# Patient Record
Sex: MALE | Race: WHITE | NOT HISPANIC OR LATINO | Employment: FULL TIME | URBAN - METROPOLITAN AREA
[De-identification: names, ages, dates, MRNs, and addresses within clinical notes are randomized per-mention and may not be internally consistent; named-entity substitution may affect disease eponyms.]

---

## 2019-10-28 ENCOUNTER — EVALUATION (OUTPATIENT)
Dept: PHYSICAL THERAPY | Facility: CLINIC | Age: 64
End: 2019-10-28
Payer: COMMERCIAL

## 2019-10-28 DIAGNOSIS — G89.29 CHRONIC LEFT SHOULDER PAIN: Primary | ICD-10-CM

## 2019-10-28 DIAGNOSIS — M25.512 CHRONIC LEFT SHOULDER PAIN: Primary | ICD-10-CM

## 2019-10-28 PROCEDURE — 97161 PT EVAL LOW COMPLEX 20 MIN: CPT | Performed by: PHYSICAL THERAPIST

## 2019-10-28 PROCEDURE — 97140 MANUAL THERAPY 1/> REGIONS: CPT | Performed by: PHYSICAL THERAPIST

## 2019-10-28 PROCEDURE — 97110 THERAPEUTIC EXERCISES: CPT | Performed by: PHYSICAL THERAPIST

## 2019-10-28 NOTE — LETTER
2019    Malena Reyez MD  [facility]    Patient: Bridget Olivo   YOB: 1955   Date of Visit: 10/28/2019     Encounter Diagnosis     ICD-10-CM    1  Chronic left shoulder pain M25 512     G89 29        Dear Dr Nguyễn Mane: Thank you for your recent referral of Bridget Olivo  Please review the attached evaluation summary from Alan's recent visit  Please verify that you agree with the plan of care by signing the attached order  If you have any questions or concerns, please do not hesitate to call  I sincerely appreciate the opportunity to share in the care of one of your patients and hope to have another opportunity to work with you in the near future  Sincerely,    Flynn Mendoza, PT      Referring Provider:      I certify that I have read the below Plan of Care and certify the need for these services furnished under this plan of treatment while under my care  Malena Reyez MD  [address] [place] [address]: [phone]          PT Evaluation     Today's date: 10/28/2019  Patient name: Bridget Olivo  : 1955  MRN: [id number]  Referring provider: No ref  provider found  Dx:   Encounter Diagnosis     ICD-10-CM    1  Chronic left shoulder pain M25 512     G89 29                   Assessment  Assessment details: Patient presents with posture related shoulder pain now due to tendonitis of the shoulder  Normal shoulder ROM and strength  Reviewed posture and issued home periscapular exercises  He had an MRI - Scar tissue and tendonitis  Discussed him getting injection to decrease the inflammation  Graston to periscap to decrease tightness  Patient has a $75 00 copay  He may come back for more visits  Impairments: poor posture   Understanding of Dx/Px/POC: good   Prognosis: good    Goals  2 weeks  Improve posture  Decrease posture related pain    4 weeks  No pain with overhead reach  Independent with HEP  Plan  Planned modality interventions: ultrasound and TENS  Planned therapy interventions: manual therapy, strengthening, stretching and home exercise program  Frequency: 2x week  Duration in weeks: 4        Subjective Evaluation    History of Present Illness  Mechanism of injury: Chronic pain in left shoulder  MRI RTC tendonitis    Pain  Current pain ratin  At worst pain ratin  Quality: sharp  Aggravating factors: overhead activity and lifting      Diagnostic Tests  MRI studies: normal  Patient Goals  Patient goals for therapy: decreased pain          Objective     Active Range of Motion   Left Shoulder   Normal active range of motion    Strength/Myotome Testing     Left Shoulder   Normal muscle strength             Precautions: RTC tendonitis      Manual  10/28            graston             taping                                                        Exercise Diary  10/28            HEP             Plamks             Side plank on elbows             Push ups 3-ways             BS rows             Pr 3-ways                                                                                                                                                                                                       Modalities              US

## 2019-10-28 NOTE — LETTER
2019    Alfonso Gonzalez MD  [facility]    Patient: Milvia Noble   YOB: 1955   Date of Visit: 10/28/2019     Encounter Diagnosis     ICD-10-CM    1  Chronic left shoulder pain M25 512     G89 29        Dear Dr Robb Jimenez: Thank you for your recent referral of Milvia Noble  Please review the attached evaluation summary from Alan's recent visit  Please verify that you agree with the plan of care by signing the attached order  If you have any questions or concerns, please do not hesitate to call  I sincerely appreciate the opportunity to share in the care of one of your patients and hope to have another opportunity to work with you in the near future  Sincerely,    Pablo Aviles, PT      Referring Provider:      I certify that I have read the below Plan of Care and certify the need for these services furnished under this plan of treatment while under my care  Alfonso Gonzalez MD  [address] [facility]: [phone]          PT Evaluation     Today's date: 10/28/2019  Patient name: Milvia Noble  : 1955  MRN: [id number]  Referring provider: No ref  provider found  Dx:   Encounter Diagnosis     ICD-10-CM    1  Chronic left shoulder pain M25 512     G89 29                   Assessment  Assessment details: Patient presents with posture related shoulder pain now due to tendonitis of the shoulder  Normal shoulder ROM and strength  Reviewed posture and issued home periscapular exercises  He had an MRI - Scar tissue and tendonitis  Discussed him getting injection to decrease the inflammation  Graston to periscap to decrease tightness  Patient has a $75 00 copay  He may come back for more visits  Impairments: poor posture   Understanding of Dx/Px/POC: good   Prognosis: good    Goals  2 weeks  Improve posture  Decrease posture related pain    4 weeks  No pain with overhead reach  Independent with HEP  Plan  Planned modality interventions: ultrasound and TENS  Planned therapy interventions: manual therapy, strengthening, stretching and home exercise program  Frequency: 2x week  Duration in weeks: 4        Subjective Evaluation    History of Present Illness  Mechanism of injury: Chronic pain in left shoulder  MRI RTC tendonitis    Pain  Current pain ratin  At worst pain ratin  Quality: sharp  Aggravating factors: overhead activity and lifting      Diagnostic Tests  MRI studies: normal  Patient Goals  Patient goals for therapy: decreased pain          Objective     Active Range of Motion   Left Shoulder   Normal active range of motion    Strength/Myotome Testing     Left Shoulder   Normal muscle strength             Precautions: RTC tendonitis      Manual  10/28            graston             taping                                                        Exercise Diary  10/28            HEP             Plamks             Side plank on elbows             Push ups 3-ways             BS rows             Pr 3-ways                                                                                                                                                                                                       Modalities              US

## 2019-10-28 NOTE — PROGRESS NOTES
PT Evaluation     Today's date: 10/28/2019  Patient name: Derick Donnelly  : 1955  MRN: [id number]  Referring provider: No ref  provider found  Dx:   Encounter Diagnosis     ICD-10-CM    1  Chronic left shoulder pain M25 512     G89 29                   Assessment  Assessment details: Patient presents with posture related shoulder pain now due to tendonitis of the shoulder  Normal shoulder ROM and strength  Reviewed posture and issued home periscapular exercises  He had an MRI - Scar tissue and tendonitis  Discussed him getting injection to decrease the inflammation  Graston to periscap to decrease tightness  Patient has a $75 00 copay  He may come back for more visits  Impairments: poor posture   Understanding of Dx/Px/POC: good   Prognosis: good    Goals  2 weeks  Improve posture  Decrease posture related pain  4 weeks  No pain with overhead reach  Independent with HEP  Plan  Planned modality interventions: ultrasound and TENS  Planned therapy interventions: manual therapy, strengthening, stretching and home exercise program  Frequency: 2x week  Duration in weeks: 4        Subjective Evaluation    History of Present Illness  Mechanism of injury: Chronic pain in left shoulder  MRI RTC tendonitis    Pain  Current pain ratin  At worst pain ratin  Quality: sharp  Aggravating factors: overhead activity and lifting      Diagnostic Tests  MRI studies: normal  Patient Goals  Patient goals for therapy: decreased pain          Objective     Active Range of Motion   Left Shoulder   Normal active range of motion    Strength/Myotome Testing     Left Shoulder   Normal muscle strength             Precautions: RTC tendonitis      Manual  10/28            graston             taping                                                        Exercise Diary  10/28            HEP             Plamks             Side plank on elbows             Push ups 3-ways             BS rows             Pr 3-ways Modalities              US

## 2019-10-31 ENCOUNTER — TRANSCRIBE ORDERS (OUTPATIENT)
Dept: PHYSICAL THERAPY | Facility: CLINIC | Age: 64
End: 2019-10-31

## 2019-10-31 DIAGNOSIS — G89.29 CHRONIC LEFT SHOULDER PAIN: Primary | ICD-10-CM

## 2019-10-31 DIAGNOSIS — M25.512 CHRONIC LEFT SHOULDER PAIN: Primary | ICD-10-CM

## 2022-01-31 ENCOUNTER — TELEPHONE (OUTPATIENT)
Dept: NEPHROLOGY | Facility: CLINIC | Age: 67
End: 2022-01-31

## 2022-01-31 NOTE — TELEPHONE ENCOUNTER
Pt's wife called and LM asking for appt  She stated that pt was in ER at [address] and was informed that kidneys are getting worse  Please advise if pt can wait till April

## 2022-07-15 ENCOUNTER — EMERGENCY (EMERGENCY)
Facility: HOSPITAL | Age: 67
LOS: 1 days | Discharge: HOME | End: 2022-07-15
Attending: EMERGENCY MEDICINE | Admitting: EMERGENCY MEDICINE
Payer: COMMERCIAL

## 2022-07-15 VITALS
SYSTOLIC BLOOD PRESSURE: 132 MMHG | TEMPERATURE: 99 F | HEART RATE: 70 BPM | OXYGEN SATURATION: 98 % | RESPIRATION RATE: 17 BRPM | DIASTOLIC BLOOD PRESSURE: 87 MMHG

## 2022-07-15 VITALS
DIASTOLIC BLOOD PRESSURE: 94 MMHG | SYSTOLIC BLOOD PRESSURE: 142 MMHG | TEMPERATURE: 98 F | OXYGEN SATURATION: 96 % | RESPIRATION RATE: 18 BRPM | WEIGHT: 164.91 LBS | HEART RATE: 67 BPM

## 2022-07-15 DIAGNOSIS — Z85.46 PERSONAL HISTORY OF MALIGNANT NEOPLASM OF PROSTATE: ICD-10-CM

## 2022-07-15 DIAGNOSIS — R10.30 LOWER ABDOMINAL PAIN, UNSPECIFIED: ICD-10-CM

## 2022-07-15 DIAGNOSIS — Z20.822 CONTACT WITH AND (SUSPECTED) EXPOSURE TO COVID-19: ICD-10-CM

## 2022-07-15 LAB
ALBUMIN SERPL ELPH-MCNC: 4.1 G/DL — SIGNIFICANT CHANGE UP (ref 3.3–5)
ALP SERPL-CCNC: 90 U/L — SIGNIFICANT CHANGE UP (ref 40–120)
ALT FLD-CCNC: 49 U/L — HIGH (ref 10–45)
ANION GAP SERPL CALC-SCNC: 10 MMOL/L — SIGNIFICANT CHANGE UP (ref 5–17)
APPEARANCE UR: CLEAR — SIGNIFICANT CHANGE UP
APTT BLD: 33.2 SEC — SIGNIFICANT CHANGE UP (ref 27.5–35.5)
AST SERPL-CCNC: 25 U/L — SIGNIFICANT CHANGE UP (ref 10–40)
BASOPHILS # BLD AUTO: 0.02 K/UL — SIGNIFICANT CHANGE UP (ref 0–0.2)
BASOPHILS NFR BLD AUTO: 0.4 % — SIGNIFICANT CHANGE UP (ref 0–2)
BILIRUB SERPL-MCNC: 0.4 MG/DL — SIGNIFICANT CHANGE UP (ref 0.2–1.2)
BILIRUB UR-MCNC: NEGATIVE — SIGNIFICANT CHANGE UP
BUN SERPL-MCNC: 22 MG/DL — SIGNIFICANT CHANGE UP (ref 7–23)
CALCIUM SERPL-MCNC: 9.5 MG/DL — SIGNIFICANT CHANGE UP (ref 8.4–10.5)
CHLORIDE SERPL-SCNC: 103 MMOL/L — SIGNIFICANT CHANGE UP (ref 96–108)
CO2 SERPL-SCNC: 28 MMOL/L — SIGNIFICANT CHANGE UP (ref 22–31)
COLOR SPEC: YELLOW — SIGNIFICANT CHANGE UP
CREAT SERPL-MCNC: 0.93 MG/DL — SIGNIFICANT CHANGE UP (ref 0.5–1.3)
DIFF PNL FLD: NEGATIVE — SIGNIFICANT CHANGE UP
EGFR: 90 ML/MIN/1.73M2 — SIGNIFICANT CHANGE UP
EOSINOPHIL # BLD AUTO: 0.04 K/UL — SIGNIFICANT CHANGE UP (ref 0–0.5)
EOSINOPHIL NFR BLD AUTO: 0.7 % — SIGNIFICANT CHANGE UP (ref 0–6)
GLUCOSE SERPL-MCNC: 271 MG/DL — HIGH (ref 70–99)
GLUCOSE UR QL: 500
HCT VFR BLD CALC: 41.7 % — SIGNIFICANT CHANGE UP (ref 39–50)
HGB BLD-MCNC: 14.4 G/DL — SIGNIFICANT CHANGE UP (ref 13–17)
IMM GRANULOCYTES NFR BLD AUTO: 0.4 % — SIGNIFICANT CHANGE UP (ref 0–1.5)
INR BLD: 0.99 — SIGNIFICANT CHANGE UP (ref 0.88–1.16)
KETONES UR-MCNC: ABNORMAL MG/DL
LEUKOCYTE ESTERASE UR-ACNC: NEGATIVE — SIGNIFICANT CHANGE UP
LIDOCAIN IGE QN: 29 U/L — SIGNIFICANT CHANGE UP (ref 7–60)
LYMPHOCYTES # BLD AUTO: 0.99 K/UL — LOW (ref 1–3.3)
LYMPHOCYTES # BLD AUTO: 18.1 % — SIGNIFICANT CHANGE UP (ref 13–44)
MCHC RBC-ENTMCNC: 30.6 PG — SIGNIFICANT CHANGE UP (ref 27–34)
MCHC RBC-ENTMCNC: 34.5 GM/DL — SIGNIFICANT CHANGE UP (ref 32–36)
MCV RBC AUTO: 88.5 FL — SIGNIFICANT CHANGE UP (ref 80–100)
MONOCYTES # BLD AUTO: 0.32 K/UL — SIGNIFICANT CHANGE UP (ref 0–0.9)
MONOCYTES NFR BLD AUTO: 5.9 % — SIGNIFICANT CHANGE UP (ref 2–14)
NEUTROPHILS # BLD AUTO: 4.07 K/UL — SIGNIFICANT CHANGE UP (ref 1.8–7.4)
NEUTROPHILS NFR BLD AUTO: 74.5 % — SIGNIFICANT CHANGE UP (ref 43–77)
NITRITE UR-MCNC: NEGATIVE — SIGNIFICANT CHANGE UP
NRBC # BLD: 0 /100 WBCS — SIGNIFICANT CHANGE UP (ref 0–0)
PH UR: 6 — SIGNIFICANT CHANGE UP (ref 5–8)
PLATELET # BLD AUTO: 150 K/UL — SIGNIFICANT CHANGE UP (ref 150–400)
POTASSIUM SERPL-MCNC: 4.5 MMOL/L — SIGNIFICANT CHANGE UP (ref 3.5–5.3)
POTASSIUM SERPL-SCNC: 4.5 MMOL/L — SIGNIFICANT CHANGE UP (ref 3.5–5.3)
PROT SERPL-MCNC: 6.5 G/DL — SIGNIFICANT CHANGE UP (ref 6–8.3)
PROT UR-MCNC: NEGATIVE MG/DL — SIGNIFICANT CHANGE UP
PROTHROM AB SERPL-ACNC: 11.8 SEC — SIGNIFICANT CHANGE UP (ref 10.5–13.4)
RBC # BLD: 4.71 M/UL — SIGNIFICANT CHANGE UP (ref 4.2–5.8)
RBC # FLD: 12.2 % — SIGNIFICANT CHANGE UP (ref 10.3–14.5)
SARS-COV-2 RNA SPEC QL NAA+PROBE: NEGATIVE — SIGNIFICANT CHANGE UP
SODIUM SERPL-SCNC: 141 MMOL/L — SIGNIFICANT CHANGE UP (ref 135–145)
SP GR SPEC: 1.02 — SIGNIFICANT CHANGE UP (ref 1–1.03)
UROBILINOGEN FLD QL: 0.2 E.U./DL — SIGNIFICANT CHANGE UP
WBC # BLD: 5.46 K/UL — SIGNIFICANT CHANGE UP (ref 3.8–10.5)
WBC # FLD AUTO: 5.46 K/UL — SIGNIFICANT CHANGE UP (ref 3.8–10.5)

## 2022-07-15 PROCEDURE — 99285 EMERGENCY DEPT VISIT HI MDM: CPT

## 2022-07-15 PROCEDURE — 87635 SARS-COV-2 COVID-19 AMP PRB: CPT

## 2022-07-15 PROCEDURE — 85730 THROMBOPLASTIN TIME PARTIAL: CPT

## 2022-07-15 PROCEDURE — 83690 ASSAY OF LIPASE: CPT

## 2022-07-15 PROCEDURE — 36415 COLL VENOUS BLD VENIPUNCTURE: CPT

## 2022-07-15 PROCEDURE — 85610 PROTHROMBIN TIME: CPT

## 2022-07-15 PROCEDURE — 80053 COMPREHEN METABOLIC PANEL: CPT

## 2022-07-15 PROCEDURE — 74177 CT ABD & PELVIS W/CONTRAST: CPT | Mod: 26,MA

## 2022-07-15 PROCEDURE — 99284 EMERGENCY DEPT VISIT MOD MDM: CPT | Mod: 25

## 2022-07-15 PROCEDURE — 85025 COMPLETE CBC W/AUTO DIFF WBC: CPT

## 2022-07-15 PROCEDURE — 74177 CT ABD & PELVIS W/CONTRAST: CPT | Mod: MA

## 2022-07-15 PROCEDURE — 81003 URINALYSIS AUTO W/O SCOPE: CPT

## 2022-07-15 NOTE — ED PROVIDER NOTE - CLINICAL SUMMARY MEDICAL DECISION MAKING FREE TEXT BOX
2 weeks intermittent lower abd pain. mild now. no fever/signs of infection. history of prior prostatectomy/cancer, diverticulitis. labs/ct ordered. ua neg for infection, wbc wnl, ct without acute pathology. plan prn tylenol. f/u pmd. return precautions discussed

## 2022-07-15 NOTE — ED PROVIDER NOTE - NSFOLLOWUPINSTRUCTIONS_ED_ALL_ED_FT
Take tylenol 2 pills every 6 hours as needed for pain. Please see your primary care provider for followup.  Call for appointment.  If you have any problems with followup, please call the ED Referral Coordinator at [phone].  Return to the ER if symptoms worsen or other concerns.    Acute Abdominal Pain    WHAT YOU NEED TO KNOW:    The cause of your abdominal pain may not be found. If a cause is found, treatment will depend on what the cause is.     DISCHARGE INSTRUCTIONS:    Return to the emergency department if:     You vomit blood or cannot stop vomiting.      You have blood in your bowel movement or it looks like tar.       You have bleeding from your rectum.       Your abdomen is larger than usual, more painful, and hard.       You have severe pain in your abdomen.       You stop passing gas and having bowel movements.       You feel weak, dizzy, or faint.    Contact your healthcare provider if:     You have a fever.      You have new signs and symptoms.      Your symptoms do not get better with treatment.       You have questions or concerns about your condition or care.    Medicines may be given to decrease pain, treat an infection, and manage your symptoms. Take your medicine as directed. Call your healthcare provider if you think your medicine is not helping or if you have side effects. Tell him if you are allergic to any medicine. Keep a list of the medicines, vitamins, and herbs you take. Include the amounts, and when and why you take them. Bring the list or the pill bottles to follow-up visits. Carry your medicine list with you in case of an emergency.    Manage your symptoms:     Apply heat on your abdomen for 20 to 30 minutes every 2 hours for as many days as directed. Heat helps decrease pain and muscle spasms.       Manage your stress. Stress may cause abdominal pain. Your healthcare provider may recommend relaxation techniques and deep breathing exercises to help decrease your stress. Your healthcare provider may recommend you talk to someone about your stress or anxiety, such as a counselor or a trusted friend. Get plenty of sleep and exercise regularly.       Limit or do not drink alcohol. Alcohol can make your abdominal pain worse. Ask your healthcare provider if it is safe for you to drink alcohol. Also ask how much is safe for you to drink.       Do not smoke. Nicotine and other chemicals in cigarettes can damage your esophagus and stomach. Ask your healthcare provider for information if you currently smoke and need help to quit. E-cigarettes or smokeless tobacco still contain nicotine. Talk to your healthcare provider before you use these products.     Make changes to the food you eat as directed: Do not eat foods that cause abdominal pain or other symptoms. Eat small meals more often.     Eat more high-fiber foods if you are constipated. High-fiber foods include fruits, vegetables, whole-grain foods, and legumes.       Do not eat foods that cause gas if you have bloating. Examples include broccoli, cabbage, and cauliflower. Do not drink soda or carbonated drinks, because these may also cause gas.       Do not eat foods or drinks that contain sorbitol or fructose if you have diarrhea and bloating. Some examples are fruit juices, candy, jelly, and sugar-free gum.       Do not eat high-fat foods, such as fried foods, cheeseburgers, hot dogs, and desserts.      Limit or do not drink caffeine. Caffeine may make symptoms, such as heart burn or nausea, worse.       Drink plenty of liquids to prevent dehydration from diarrhea or vomiting. Ask your healthcare provider how much liquid to drink each day and which liquids are best for you.     Follow up with your healthcare provider as directed: Write down your questions so you remember to ask them during your visits.        SEEK IMMEDIATE MEDICAL CARE IF YOU HAVE ANY OF THE FOLLOWING SYMPTOMS: worsening abdominal pain, uncontrollable vomiting, profuse diarrhea, inability to have bowel movements or pass gas, black or bloody stools, fever accompanying chest pain or back pain, or fainting. These symptoms may represent a serious problem that is an emergency. Do not wait to see if the symptoms will go away. Get medical help right away. Call 911 and do not drive yourself to the hospital.

## 2022-07-15 NOTE — ED ADULT NURSE NOTE - OBJECTIVE STATEMENT
.  67years male alert mental state (AOX3) received on foot.  -Allergy: N/A.  -complain of Rt groin pain.  Hx of HTN, DM. pt started on/off Rt groin pain for 2 weeks.   -denied chest pain, dizziness, headache, n/v/d, leg swelling.  Pt is in the bed comfortably at this time. Will continue to monitor and document any changes.

## 2022-07-15 NOTE — ED PROVIDER NOTE - PATIENT PORTAL LINK FT
You can access the FollowMyHealth Patient Portal offered by [facility] by registering at the following website: [email or web address]. By joining [employer]’s FollowMyHealth portal, you will also be able to view your health information using other applications (apps) compatible with our system.

## 2022-07-15 NOTE — ED PROVIDER NOTE - OBJECTIVE STATEMENT
history of prostate cancer s/p resection, here with lower abdominal pain for the past 2 weeks. Described as intermittent, burning, mostly suprapubic, sometimes rlq. Currently subsided. Denies trauma, n/v/d, dysuria, fever, chills. Getting ready to travel and wanted to make sure it wasn't anything serious.

## 2022-09-05 ENCOUNTER — EMERGENCY (EMERGENCY)
Facility: HOSPITAL | Age: 67
LOS: 1 days | Discharge: HOME | End: 2022-09-05
Attending: EMERGENCY MEDICINE | Admitting: EMERGENCY MEDICINE
Payer: COMMERCIAL

## 2022-09-05 VITALS
OXYGEN SATURATION: 96 % | SYSTOLIC BLOOD PRESSURE: 153 MMHG | HEART RATE: 72 BPM | TEMPERATURE: 98 F | DIASTOLIC BLOOD PRESSURE: 99 MMHG | RESPIRATION RATE: 18 BRPM

## 2022-09-05 PROCEDURE — 99285 EMERGENCY DEPT VISIT HI MDM: CPT

## 2022-09-05 NOTE — ED ADULT TRIAGE NOTE - ARRIVAL INFO ADDITIONAL COMMENTS
To ED c/o Right Groin pain starting in the Am. Reports smiler pain 2 months ago and seen at ED. "They could not find anything". Ambulates with a steady gait.

## 2022-09-05 NOTE — ED ADULT NURSE NOTE - OBJECTIVE STATEMENT
Pt is 67M c/o R sided groin pain x2weeks. No fever, chills, nausea, vomiting, no burning on urination. No testicular pain. Pt is 67M c/o R sided groin pain x2weeks. Area is not tender to palpation, no obvious bleeding or ecchymotic area. Pt has PMH prostate cancer [id number] ago, currently in remission. No fever, chills, nausea, vomiting, no burning on urination. No testicular pain. Pt able to urinate without difficulty.

## 2022-09-06 VITALS
DIASTOLIC BLOOD PRESSURE: 100 MMHG | OXYGEN SATURATION: 98 % | HEART RATE: 63 BPM | RESPIRATION RATE: 18 BRPM | SYSTOLIC BLOOD PRESSURE: 172 MMHG

## 2022-09-06 PROBLEM — C61 MALIGNANT NEOPLASM OF PROSTATE: Chronic | Status: ACTIVE | Noted: 2022-07-15

## 2022-09-06 LAB
ALBUMIN SERPL ELPH-MCNC: 4.6 G/DL — SIGNIFICANT CHANGE UP (ref 3.3–5)
ALP SERPL-CCNC: 67 U/L — SIGNIFICANT CHANGE UP (ref 40–120)
ALT FLD-CCNC: 23 U/L — SIGNIFICANT CHANGE UP (ref 10–45)
ANION GAP SERPL CALC-SCNC: 8 MMOL/L — SIGNIFICANT CHANGE UP (ref 5–17)
APPEARANCE UR: CLEAR — SIGNIFICANT CHANGE UP
AST SERPL-CCNC: 17 U/L — SIGNIFICANT CHANGE UP (ref 10–40)
BASOPHILS # BLD AUTO: 0.02 K/UL — SIGNIFICANT CHANGE UP (ref 0–0.2)
BASOPHILS NFR BLD AUTO: 0.4 % — SIGNIFICANT CHANGE UP (ref 0–2)
BILIRUB SERPL-MCNC: 0.6 MG/DL — SIGNIFICANT CHANGE UP (ref 0.2–1.2)
BILIRUB UR-MCNC: NEGATIVE — SIGNIFICANT CHANGE UP
BUN SERPL-MCNC: 18 MG/DL — SIGNIFICANT CHANGE UP (ref 7–23)
CALCIUM SERPL-MCNC: 9.4 MG/DL — SIGNIFICANT CHANGE UP (ref 8.4–10.5)
CHLORIDE SERPL-SCNC: 107 MMOL/L — SIGNIFICANT CHANGE UP (ref 96–108)
CO2 SERPL-SCNC: 28 MMOL/L — SIGNIFICANT CHANGE UP (ref 22–31)
COLOR SPEC: YELLOW — SIGNIFICANT CHANGE UP
CREAT SERPL-MCNC: 1.14 MG/DL — SIGNIFICANT CHANGE UP (ref 0.5–1.3)
DIFF PNL FLD: NEGATIVE — SIGNIFICANT CHANGE UP
EGFR: 70 ML/MIN/1.73M2 — SIGNIFICANT CHANGE UP
EOSINOPHIL # BLD AUTO: 0.11 K/UL — SIGNIFICANT CHANGE UP (ref 0–0.5)
EOSINOPHIL NFR BLD AUTO: 2 % — SIGNIFICANT CHANGE UP (ref 0–6)
GLUCOSE SERPL-MCNC: 134 MG/DL — HIGH (ref 70–99)
GLUCOSE UR QL: NEGATIVE — SIGNIFICANT CHANGE UP
HCT VFR BLD CALC: 40.7 % — SIGNIFICANT CHANGE UP (ref 39–50)
HGB BLD-MCNC: 14.1 G/DL — SIGNIFICANT CHANGE UP (ref 13–17)
IMM GRANULOCYTES NFR BLD AUTO: 0.2 % — SIGNIFICANT CHANGE UP (ref 0–1.5)
KETONES UR-MCNC: NEGATIVE — SIGNIFICANT CHANGE UP
LEUKOCYTE ESTERASE UR-ACNC: NEGATIVE — SIGNIFICANT CHANGE UP
LYMPHOCYTES # BLD AUTO: 1.38 K/UL — SIGNIFICANT CHANGE UP (ref 1–3.3)
LYMPHOCYTES # BLD AUTO: 25.6 % — SIGNIFICANT CHANGE UP (ref 13–44)
MCHC RBC-ENTMCNC: 30.7 PG — SIGNIFICANT CHANGE UP (ref 27–34)
MCHC RBC-ENTMCNC: 34.6 GM/DL — SIGNIFICANT CHANGE UP (ref 32–36)
MCV RBC AUTO: 88.7 FL — SIGNIFICANT CHANGE UP (ref 80–100)
MONOCYTES # BLD AUTO: 0.44 K/UL — SIGNIFICANT CHANGE UP (ref 0–0.9)
MONOCYTES NFR BLD AUTO: 8.2 % — SIGNIFICANT CHANGE UP (ref 2–14)
NEUTROPHILS # BLD AUTO: 3.43 K/UL — SIGNIFICANT CHANGE UP (ref 1.8–7.4)
NEUTROPHILS NFR BLD AUTO: 63.6 % — SIGNIFICANT CHANGE UP (ref 43–77)
NITRITE UR-MCNC: NEGATIVE — SIGNIFICANT CHANGE UP
NRBC # BLD: 0 /100 WBCS — SIGNIFICANT CHANGE UP (ref 0–0)
PH UR: 6 — SIGNIFICANT CHANGE UP (ref 5–8)
PLATELET # BLD AUTO: 163 K/UL — SIGNIFICANT CHANGE UP (ref 150–400)
POTASSIUM SERPL-MCNC: 4.5 MMOL/L — SIGNIFICANT CHANGE UP (ref 3.5–5.3)
POTASSIUM SERPL-SCNC: 4.5 MMOL/L — SIGNIFICANT CHANGE UP (ref 3.5–5.3)
PROT SERPL-MCNC: 6.3 G/DL — SIGNIFICANT CHANGE UP (ref 6–8.3)
PROT UR-MCNC: NEGATIVE MG/DL — SIGNIFICANT CHANGE UP
RBC # BLD: 4.59 M/UL — SIGNIFICANT CHANGE UP (ref 4.2–5.8)
RBC # FLD: 13 % — SIGNIFICANT CHANGE UP (ref 10.3–14.5)
SODIUM SERPL-SCNC: 143 MMOL/L — SIGNIFICANT CHANGE UP (ref 135–145)
SP GR SPEC: >=1.03 — SIGNIFICANT CHANGE UP (ref 1–1.03)
UROBILINOGEN FLD QL: 0.2 E.U./DL — SIGNIFICANT CHANGE UP
WBC # BLD: 5.39 K/UL — SIGNIFICANT CHANGE UP (ref 3.8–10.5)
WBC # FLD AUTO: 5.39 K/UL — SIGNIFICANT CHANGE UP (ref 3.8–10.5)

## 2022-09-06 PROCEDURE — 36415 COLL VENOUS BLD VENIPUNCTURE: CPT

## 2022-09-06 PROCEDURE — 76870 US EXAM SCROTUM: CPT | Mod: 26

## 2022-09-06 PROCEDURE — G1004: CPT

## 2022-09-06 PROCEDURE — 85025 COMPLETE CBC W/AUTO DIFF WBC: CPT

## 2022-09-06 PROCEDURE — 80053 COMPREHEN METABOLIC PANEL: CPT

## 2022-09-06 PROCEDURE — 76870 US EXAM SCROTUM: CPT

## 2022-09-06 PROCEDURE — 74177 CT ABD & PELVIS W/CONTRAST: CPT | Mod: 26,MG

## 2022-09-06 PROCEDURE — 81003 URINALYSIS AUTO W/O SCOPE: CPT

## 2022-09-06 PROCEDURE — 74177 CT ABD & PELVIS W/CONTRAST: CPT | Mod: MG

## 2022-09-06 PROCEDURE — 99284 EMERGENCY DEPT VISIT MOD MDM: CPT | Mod: 25

## 2022-09-06 RX ORDER — DIATRIZOATE MEGLUMINE 180 MG/ML
30 INJECTION, SOLUTION INTRAVESICAL ONCE
Refills: 0 | Status: COMPLETED | OUTPATIENT
Start: 2022-09-06 | End: 2022-09-06

## 2022-09-06 RX ADMIN — DIATRIZOATE MEGLUMINE 30 MILLILITER(S): 180 INJECTION, SOLUTION INTRAVESICAL at 00:57

## 2022-09-06 NOTE — ED PROVIDER NOTE - PROGRESS NOTE DETAILS
Radha: no acute findings on imaging. discussed with pt.  re-examined: has no pain now, no tenderness on exam, no hernia appreciated.  will follow up with gen surg as outpt.

## 2022-09-06 NOTE — ED PROVIDER NOTE - SHIFT CHANGE DETAILS
67M c/o recurrent R groin pain. already neg labs/ct on 7/15/22. avss. no acute abd. labs wnl. s/p testicular us and ct a/p.    dispo: pending us and ct reads -- anticipate dc home w/ outpatient surgery referral if no acute abnl

## 2022-09-06 NOTE — ED PROVIDER NOTE - CLINICAL SUMMARY MEDICAL DECISION MAKING FREE TEXT BOX
67M c/o recurrent R groin pain. already neg labs/ct on 7/15/22. avss. no acute abd. labs wnl. s/p testicular us and ct a/p. s/o'd overnight team stable pending us and ct reads -- anticipate dc home w/ outpatient surgery referral if no acute abnl.

## 2022-09-06 NOTE — ED ADULT NURSE REASSESSMENT NOTE - NS ED NURSE REASSESS COMMENT FT1
Pt tolerating PO contrast well. Pt pending CT scan, US results. Pt A&Ox4, updated on plan of care with understanding verbalized.

## 2022-09-06 NOTE — ED PROVIDER NOTE - NSFOLLOWUPINSTRUCTIONS_ED_ALL_ED_FT
Follow up with general surgeon for re-evaluation.  Return to the Emergency Department if you have any new or worsening symptoms, or if you have any concerns.  ===================    Groin Pain    WHAT YOU NEED TO KNOW:    Groin pain may be felt only in your groin, or it may spread to your buttocks, thigh, or knee. An injury to your hip joint, pelvic area, lower back, or thighs can cause groin pain.    DISCHARGE INSTRUCTIONS:    Medicines: You may need any of the following:   •NSAIDs, such as ibuprofen, help decrease swelling, pain, and fever. This medicine is available with or without a doctor's order. NSAIDs can cause stomach bleeding or kidney problems in certain people. If you take blood thinner medicine, always ask if NSAIDs are safe for you. Always read the medicine label and follow directions. Do not give these medicines to children younger than 6 months without direction from a healthcare provider.      •Acetaminophen decreases pain. It is available without a doctor's order. Ask how much to take and how often to take it. Follow directions. Acetaminophen can cause liver damage if not taken correctly.       •Take your medicine as directed. Contact your healthcare provider if you think your medicine is not helping or if you have side effects. Tell your provider if you are allergic to any medicine. Keep a list of the medicines, vitamins, and herbs you take. Include the amounts, and when and why you take them. Bring the list or the pill bottles to follow-up visits. Carry your medicine list with you in case of an emergency.      Follow up with your healthcare provider as directed: You may need to return for more tests. Write down your questions so you remember to ask them during your visits.     Self-care:   •Rest as much as possible. Avoid activities that cause or increase your pain.      •Apply ice on your groin for 15 to 20 minutes every hour or as directed. Use an ice pack, or put crushed ice in a plastic bag. Cover it with a towel. Ice helps prevent tissue damage and decreases swelling and pain.       •Apply heat on your groin for 20 to 30 minutes every 2 hours for as many days as directed. Heat helps decrease pain and muscle spasms.       Contact your healthcare provider if:   •You have a fever.       •You have questions or concerns about your condition or care.      Return to the emergency department if:   •You have severe pain even after you take medicine.       •You have pain or burning when you urinate.       •You have pain on your side that spreads to your groin.

## 2022-09-06 NOTE — ED PROVIDER NOTE - PATIENT PORTAL LINK FT
You can access the FollowMyHealth Patient Portal offered by [facility] by registering at the following website: [email or web address]. By joining [employer]’s FollowMyHealth portal, you will also be able to view your health information using other applications (apps) compatible with our system.

## 2022-09-06 NOTE — ED PROVIDER NOTE - CARE PROVIDER_API CALL
Leonel Elizondo)  Surgery  [address], [place] [address]  Phone: [phone]  Fax: [phone]  Follow Up Time:     Michael Cobian  SURGERY  [address] [place] [address]  Phone: [phone]  Fax: [phone]  Follow Up Time:

## 2022-09-06 NOTE — ED PROVIDER NOTE - PHYSICAL EXAMINATION
CONST: nontoxic NAD speaking in full sentences  HEAD: atraumatic  EYES: conjunctivae clear  ENT: mmm  NECK: supple/FROM  CARD: rrr no murmurs  CHEST: ctab no r/r/w  ABD: soft, nd, nttp, no rebound/guarding, no palpable groin mass  EXT: FROM, symmetric distal pulses intact  SKIN: warm, dry, no rash, cap refill <2sec  NEURO: a+ox3, 5/5 strength x4, gross sensation intact x4, baseline gait

## 2022-09-06 NOTE — ED PROVIDER NOTE - OBJECTIVE STATEMENT
67M overweight, cad s/p cabg, diverticulosis s/p partial bowel resection, prostate ca s/p resection, recently seen for similar sx w/ neg labs/ct (7/15/22), now c/o recurrent R groin pain. pt unsure if related to exertion/position. no fever/chills, no uri/cough, no cp/sob, no abd pain/n/v, no diarrhea/constipation, no hematochezia/melena, no change in appetite/po intake, no dysuria, no testicular pain, no rash, no trauma, no etoh-dpt/ivdu.

## 2022-09-08 DIAGNOSIS — Z95.1 PRESENCE OF AORTOCORONARY BYPASS GRAFT: ICD-10-CM

## 2022-09-08 DIAGNOSIS — K57.90 DIVERTICULOSIS OF INTESTINE, PART UNSPECIFIED, WITHOUT PERFORATION OR ABSCESS WITHOUT BLEEDING: ICD-10-CM

## 2022-09-08 DIAGNOSIS — Z85.46 PERSONAL HISTORY OF MALIGNANT NEOPLASM OF PROSTATE: ICD-10-CM

## 2022-09-08 DIAGNOSIS — R10.31 RIGHT LOWER QUADRANT PAIN: ICD-10-CM

## 2022-09-08 DIAGNOSIS — I25.10 ATHEROSCLEROTIC HEART DISEASE OF NATIVE CORONARY ARTERY WITHOUT ANGINA PECTORIS: ICD-10-CM

## 2022-09-08 DIAGNOSIS — E66.3 OVERWEIGHT: ICD-10-CM

## 2022-09-19 ENCOUNTER — APPOINTMENT (EMERGENCY)
Dept: CT IMAGING | Facility: HOSPITAL | Age: 67
End: 2022-09-19
Payer: COMMERCIAL

## 2022-09-19 ENCOUNTER — HOSPITAL ENCOUNTER (EMERGENCY)
Facility: HOSPITAL | Age: 67
Discharge: HOME | End: 2022-09-19
Attending: EMERGENCY MEDICINE
Payer: COMMERCIAL

## 2022-09-19 VITALS
SYSTOLIC BLOOD PRESSURE: 129 MMHG | OXYGEN SATURATION: 96 % | RESPIRATION RATE: 19 BRPM | HEART RATE: 64 BPM | DIASTOLIC BLOOD PRESSURE: 71 MMHG | TEMPERATURE: 98.2 F

## 2022-09-19 DIAGNOSIS — K40.90 INGUINAL HERNIA: ICD-10-CM

## 2022-09-19 DIAGNOSIS — R10.9 ABDOMINAL PAIN: Primary | ICD-10-CM

## 2022-09-19 LAB
ALBUMIN SERPL BCP-MCNC: 3.8 G/DL (ref 3.5–5)
ALP SERPL-CCNC: 81 U/L (ref 46–116)
ALT SERPL W P-5'-P-CCNC: 40 U/L (ref 12–78)
ANION GAP SERPL CALCULATED.3IONS-SCNC: 8 MMOL/L (ref 4–13)
AST SERPL W P-5'-P-CCNC: 18 U/L (ref 5–45)
BASOPHILS # BLD AUTO: 0.02 THOUSANDS/ΜL (ref 0–0.1)
BASOPHILS NFR BLD AUTO: 1 % (ref 0–1)
BILIRUB SERPL-MCNC: 0.58 MG/DL (ref 0.2–1)
BILIRUB UR QL STRIP: NEGATIVE
BUN SERPL-MCNC: 26 MG/DL (ref 5–25)
CALCIUM SERPL-MCNC: 8.7 MG/DL (ref 8.3–10.1)
CHLORIDE SERPL-SCNC: 104 MMOL/L (ref 96–108)
CLARITY UR: CLEAR
CO2 SERPL-SCNC: 26 MMOL/L (ref 21–32)
COLOR UR: YELLOW
CREAT SERPL-MCNC: 0.99 MG/DL (ref 0.6–1.3)
EOSINOPHIL # BLD AUTO: 0.1 THOUSAND/ΜL (ref 0–0.61)
EOSINOPHIL NFR BLD AUTO: 2 % (ref 0–6)
ERYTHROCYTE [DISTWIDTH] IN BLOOD BY AUTOMATED COUNT: 12.8 % (ref 11.6–15.1)
GFR SERPL CREATININE-BSD FRML MDRD: 78 ML/MIN/1.73SQ M
GLUCOSE SERPL-MCNC: 198 MG/DL (ref 65–140)
GLUCOSE UR STRIP-MCNC: ABNORMAL MG/DL
HCT VFR BLD AUTO: 40 % (ref 36.5–49.3)
HGB BLD-MCNC: 13.8 G/DL (ref 12–17)
HGB UR QL STRIP.AUTO: NEGATIVE
IMM GRANULOCYTES # BLD AUTO: 0.01 THOUSAND/UL (ref 0–0.2)
IMM GRANULOCYTES NFR BLD AUTO: 0 % (ref 0–2)
KETONES UR STRIP-MCNC: NEGATIVE MG/DL
LEUKOCYTE ESTERASE UR QL STRIP: NEGATIVE
LIPASE SERPL-CCNC: 87 U/L (ref 73–393)
LYMPHOCYTES # BLD AUTO: 1.48 THOUSANDS/ΜL (ref 0.6–4.47)
LYMPHOCYTES NFR BLD AUTO: 35 % (ref 14–44)
MCH RBC QN AUTO: 30.7 PG (ref 26.8–34.3)
MCHC RBC AUTO-ENTMCNC: 34.5 G/DL (ref 31.4–37.4)
MCV RBC AUTO: 89 FL (ref 82–98)
MONOCYTES # BLD AUTO: 0.41 THOUSAND/ΜL (ref 0.17–1.22)
MONOCYTES NFR BLD AUTO: 10 % (ref 4–12)
NEUTROPHILS # BLD AUTO: 2.24 THOUSANDS/ΜL (ref 1.85–7.62)
NEUTS SEG NFR BLD AUTO: 52 % (ref 43–75)
NITRITE UR QL STRIP: NEGATIVE
NRBC BLD AUTO-RTO: 0 /100 WBCS
PH UR STRIP.AUTO: 6 [PH]
PLATELET # BLD AUTO: 152 THOUSANDS/UL (ref 149–390)
PMV BLD AUTO: 10.6 FL (ref 8.9–12.7)
POTASSIUM SERPL-SCNC: 4 MMOL/L (ref 3.5–5.3)
PROT SERPL-MCNC: 6.7 G/DL (ref 6.4–8.4)
PROT UR STRIP-MCNC: NEGATIVE MG/DL
RBC # BLD AUTO: 4.49 MILLION/UL (ref 3.88–5.62)
SODIUM SERPL-SCNC: 138 MMOL/L (ref 135–147)
SP GR UR STRIP.AUTO: 1.02 (ref 1–1.03)
UROBILINOGEN UR QL STRIP.AUTO: 0.2 E.U./DL
WBC # BLD AUTO: 4.26 THOUSAND/UL (ref 4.31–10.16)

## 2022-09-19 PROCEDURE — 36415 COLL VENOUS BLD VENIPUNCTURE: CPT | Performed by: EMERGENCY MEDICINE

## 2022-09-19 PROCEDURE — 83690 ASSAY OF LIPASE: CPT | Performed by: EMERGENCY MEDICINE

## 2022-09-19 PROCEDURE — 99284 EMERGENCY DEPT VISIT MOD MDM: CPT

## 2022-09-19 PROCEDURE — 85025 COMPLETE CBC W/AUTO DIFF WBC: CPT | Performed by: EMERGENCY MEDICINE

## 2022-09-19 PROCEDURE — 81003 URINALYSIS AUTO W/O SCOPE: CPT | Performed by: EMERGENCY MEDICINE

## 2022-09-19 PROCEDURE — 80053 COMPREHEN METABOLIC PANEL: CPT | Performed by: EMERGENCY MEDICINE

## 2022-09-19 PROCEDURE — 74177 CT ABD & PELVIS W/CONTRAST: CPT

## 2022-09-19 PROCEDURE — 99284 EMERGENCY DEPT VISIT MOD MDM: CPT | Performed by: EMERGENCY MEDICINE

## 2022-09-19 RX ADMIN — IOHEXOL 100 ML: 350 INJECTION, SOLUTION INTRAVENOUS at 22:21

## 2022-09-20 NOTE — ED PROVIDER NOTES
History  Chief Complaint   Patient presents with    Abdominal Pain     Pt reporting R lower abdominal/groin pain states he has been evaluated 3x at ED for same complaint  Reports issue has been ongoing for 1 month  79 y o  male presents with one month of right lower quadrant abdominal pain without radiation  Patient describes burning and discomfort that waxes and wanes during that period and returned tonight worse than previously though it has abated somewhat currently  Patient states nothing aggravates the pain and NSAIDs somewhat alleviate it  Patient went to an outside hospital where he states he received an x-ray and subsequently an ultrasound without etiology of his symptoms identified  Patient states that he cannot take NSAIDs currently because he is planning on having the surgery this week  Patient denies vomiting  Patient denies diarrhea  Patient denies any urinary symptoms  Patient denies testicular pain or penile discharge  ROS: Patient denies fever/chills, dyspnea, chest pain, constipation, diaphoresis, groin pain, dysuria, hematuria, melena, or back/neck pain  All other systems reviewed and negative  Patient denies contacts with similar symptoms  Patient denies any recent use of antibiotics or trauma  Patient went to [facility] 2 weeks ago but this was after the episodes started and he denies any sick contacts or infectious risk factors during that trip  Patient notes history of prior colectomy for prostate cancer  Focused Objective Exam:  Abdomen:  Inspection of an obese abdomen with previous abdominal surgical incisions noted without erythema, rashes or ecchymosis noted  No abdominal pulsations noted  Normal bowel sounds with no bruit auscultated  Soft abdomen  Palpitation noted tenderness in the right suprapubic region with lesser tenderness in the right lower quadrant; tenderness not directly over McBurney's point    No masses or pulsatile aorta noted on examination  No rebound or guarding noted on examination, non-peritoneal exam     Back:  No rash or signs of herpes zoster  Skin:  No ecchymosis of the umbilicus (negative Slater's sign) or flank (negative Grey Montalvo's sign)  Warm and dry  Medical Decision Making  Abdominal pain with a broad differential   Will establish IV access and make patient NPO considering possibility of surgical intervention required  Will initiate symptomatic management including intravenous fluids  Differential includes significant likelihood of intra-abdominal pathology, including concerns for potential inguinal hernia  Less likely obstruction considering lack of nausea or vomiting  Patient denies any testicular pain or any flank pain though could represent atypical renal pathology but chronicity makes this somewhat unlikely  Will obtain CBC to evaluate for anemia and leukocytosis  Will obtain CMP to evaluate electrolytes, renal, biliary, and hepatic function  Will obtain lipase to evaluate for potential pancreatitis  Based on patient's age, history, physical exam and presenting complaint, will obtain contrast enhanced CT imaging of patient's abdomen and pelvis to further evaluate for possible intraabdominal pathology          History provided by:  Patient  Abdominal Pain  Pain location:  Suprapubic  Pain quality comment:  See HPI  Pain radiates to:  Does not radiate  Pain severity:  Moderate  Onset quality:  Sudden  Timing:  Constant  Progression:  Worsening  Chronicity:  Recurrent  Relieved by:  NSAIDs  Worsened by:  Nothing  Associated symptoms: no belching, no chest pain, no chills, no constipation, no cough, no diarrhea, no dysuria, no fatigue, no fever, no hematemesis, no hematochezia, no hematuria, no melena, no nausea, no shortness of breath, no sore throat and no vomiting    Risk factors: multiple surgeries        None       Past Medical History:   Diagnosis Date    Diabetes ([facility] [address] )     Diverticulitis     HTN (hypertension)     Hx of CABG     Prostate cancer [facility])        Past Surgical History:   Procedure Laterality Date    JOINT REPLACEMENT         No family history on file  I have reviewed and agree with the history as documented  E-Cigarette/Vaping     E-Cigarette/Vaping Substances          Review of Systems   Constitutional: Negative for chills, fatigue and fever  HENT: Negative for sore throat  Respiratory: Negative for cough and shortness of breath  Cardiovascular: Negative for chest pain  Gastrointestinal: Positive for abdominal pain  Negative for constipation, diarrhea, hematemesis, hematochezia, melena, nausea and vomiting  Genitourinary: Negative for dysuria and hematuria  All other systems reviewed and are negative  Physical Exam  Physical Exam  Vitals reviewed  HENT:      Head: Atraumatic  Eyes:      Pupils: Pupils are equal, round, and reactive to light  Cardiovascular:      Rate and Rhythm: Normal rate  Pulmonary:      Effort: Pulmonary effort is normal    Abdominal:      General: There is no distension  Palpations: Abdomen is soft  Tenderness: There is abdominal tenderness in the right lower quadrant and suprapubic area  There is no guarding or rebound  Musculoskeletal:         General: No deformity  Cervical back: Neck supple  Skin:     General: Skin is warm and dry  Neurological:      General: No focal deficit present  Mental Status: He is alert     Psychiatric:         Mood and Affect: Mood normal          Vital Signs  ED Triage Vitals   Temperature Pulse Respirations Blood Pressure SpO2   09/19/22 2126 09/19/22 2124 09/19/22 2124 09/19/22 2124 09/19/22 2124   98 2 °F (36 8 °C) 78 18 (!) 192/105 96 %      Temp Source Heart Rate Source Patient Position - Orthostatic VS BP Location FiO2 (%)   09/19/22 2126 09/19/22 2124 09/19/22 2124 09/19/22 2124 --   Oral Monitor Sitting Left arm       Pain Score       --                  Vitals:    09/19/22 2142 09/19/22 2212 09/19/22 2258 09/19/22 2312   BP: 159/91 156/84 126/71 129/71   Pulse: 70 72 69 64   Patient Position - Orthostatic VS:             Visual Acuity      ED Medications  Medications   iohexol (OMNIPAQUE) 350 MG/ML injection (MULTI-DOSE) 100 mL (100 mL Intravenous Given 9/19/22 2221)       Diagnostic Studies  Results Reviewed     Procedure Component Value Units Date/Time    UA w Reflex to Microscopic w Reflex to Culture [[id number]]  (Abnormal) Collected: 09/19/22 2224    Lab Status: Final result Specimen: Urine, Other Updated: 09/19/22 2234     Color, UA Yellow     Clarity, UA Clear     Specific [place], UA 1 020     pH, UA 6 0     Leukocytes, UA Negative     Nitrite, UA Negative     Protein, UA Negative mg/dl      Glucose,  (1/4%) mg/dl      Ketones, UA Negative mg/dl      Urobilinogen, UA 0 2 E U /dl      Bilirubin, UA Negative     Occult Blood, UA Negative    CMP [[id number]]  (Abnormal) Collected: 09/19/22 2151    Lab Status: Final result Specimen: Blood from Arm, Left Updated: 09/19/22 2214     Sodium 138 mmol/L      Potassium 4 0 mmol/L      Chloride 104 mmol/L      CO2 26 mmol/L      ANION GAP 8 mmol/L      BUN 26 mg/dL      Creatinine 0 99 mg/dL      Glucose 198 mg/dL      Calcium 8 7 mg/dL      AST 18 U/L      ALT 40 U/L      Alkaline Phosphatase 81 U/L      Total Protein 6 7 g/dL      Albumin 3 8 g/dL      Total Bilirubin 0 58 mg/dL      eGFR 78 ml/min/1 73sq m     Narrative:      [place] guidelines for Chronic Kidney Disease (CKD):     Stage 1 with normal or high GFR (GFR > 90 mL/min/1 73 square meters)    Stage 2 Mild CKD (GFR = 60-89 mL/min/1 73 square meters)    Stage 3A Moderate CKD (GFR = 45-59 mL/min/1 73 square meters)    Stage 3B Moderate CKD (GFR = 30-44 mL/min/1 73 square meters)    Stage 4 Severe CKD (GFR = 15-29 mL/min/1 73 square meters)    Stage 5 End Stage CKD (GFR <15 mL/min/1 73 square meters)  Note: GFR calculation is accurate only with a steady state creatinine    Lipase [[id number]]  (Normal) Collected: 09/19/22 2151    Lab Status: Final result Specimen: Blood from Arm, Left Updated: 09/19/22 2214     Lipase 87 u/L     CBC and differential [[id number]]  (Abnormal) Collected: 09/19/22 2151    Lab Status: Final result Specimen: Blood from Arm, Left Updated: 09/19/22 2156     WBC 4 26 Thousand/uL      RBC 4 49 Million/uL      Hemoglobin 13 8 g/dL      Hematocrit 40 0 %      MCV 89 fL      MCH 30 7 pg      MCHC 34 5 g/dL      RDW 12 8 %      MPV 10 6 fL      Platelets 373 Thousands/uL      nRBC 0 /100 WBCs      Neutrophils Relative 52 %      Immat GRANS % 0 %      Lymphocytes Relative 35 %      Monocytes Relative 10 %      Eosinophils Relative 2 %      Basophils Relative 1 %      Neutrophils Absolute 2 24 Thousands/µL      Immature Grans Absolute 0 01 Thousand/uL      Lymphocytes Absolute 1 48 Thousands/µL      Monocytes Absolute 0 41 Thousand/µL      Eosinophils Absolute 0 10 Thousand/µL      Basophils Absolute 0 02 Thousands/µL                  CT abdomen pelvis with contrast   Final Result by April Reyes DO (09/19 2247)      Small bilateral fat-containing inguinal hernias  No acute inflammatory process identified  Workstation performed: [id number]                    Procedures  Procedures         ED Course  ED Course as of 09/20/22 0314   Mon Sep 19, 2022   2257 Patient's CT demonstrating bilateral inguinal hernias  Considering patient's constellation of symptoms and associated imaging findings, concerns that the right inguinal hernia may have a small amount of fat incarceration causing patient's intermittent pain  Discussed this with the patient and provided information on follow-up with surgery to discuss elective repair if these become recurrent  Discussed emphasized return precautions including signs and symptoms of bowel obstruction  Fortunately there was no signs of bowel involvement    Discussed continued symptomatic management  Discussed emphasized return precautions                               SBIRT 20yo+    Flowsheet Row Most Recent Value   SBIRT (23 yo +)    In order to provide better care to our patients, we are screening all of our patients for alcohol and drug use  Would it be okay to ask you these screening questions? Yes Filed at: 09/19/2022 2138   Initial Alcohol Screen: US AUDIT-C     1  How often do you have a drink containing alcohol? 1 Filed at: 09/19/2022 2138   2  How many drinks containing alcohol do you have on a typical day you are drinking? 1 Filed at: 09/19/2022 2138   3a  Male UNDER 65: How often do you have five or more drinks on one occasion? 0 Filed at: 09/19/2022 2138   Audit-C Score 2 Filed at: 09/19/2022 2138   SHALINI: How many times in the past year have you    Used an illegal drug or used a prescription medication for non-medical reasons? Never Filed at: 09/19/2022 2138                    MDM    Disposition  Final diagnoses:   Abdominal pain   Inguinal hernia     Time reflects when diagnosis was documented in both MDM as applicable and the Disposition within this note     Time User Action Codes Description Comment    9/19/2022 10:55 PM Stephanie Hinton [R10 9] Abdominal pain     9/19/2022 10:55 PM John Bynum [K40 90] Inguinal hernia       ED Disposition     ED Disposition   Discharge    Condition   Stable    Date/Time   Mon Sep 19, 2022 10:56 PM    Comment   Kelly Vail discharge to home/self care  Follow-up Information     Follow up With Specialties Details Why Contact Info Additional [address] [facility] [address] [facility] Schedule an appointment as soon as possible for a visit in 3 days Follow-up and reassessment  Discussed elective repair of your inguinal hernia   [address], [address]  [address] [facility], [facility], [place], [address] [place] [address] [facility] Go to  If symptoms worsen [address] [facility] Emergency Department, [address] [place] [address], [facility]          There are no discharge medications for this patient  No discharge procedures on file      PDMP Review     None          ED Provider  Electronically Signed by           Nely Sotelo MD  09/20/22 822 [address] Marquita Guerra MD  09/20/22 [id number]

## 2022-09-20 NOTE — ED NOTES
Discharge instructions and follow up reviewed by provider  Patient AAO x4, ambulatory to waiting room  Accompanied by significant other         Blane Pace RN  09/19/22 1297

## 2022-09-20 NOTE — DISCHARGE INSTRUCTIONS
IMPRESSION:     Small bilateral fat-containing inguinal hernias  No acute inflammatory process identified

## 2022-10-31 ENCOUNTER — TELEPHONE (OUTPATIENT)
Dept: SURGERY | Facility: CLINIC | Age: 67
End: 2022-10-31

## 2022-10-31 NOTE — TELEPHONE ENCOUNTER
Devonte Archer  Ref # [id number]    "[facility] is does not show as participating, howver if in network with local [employer], then this is [place] netowrk"  No referral required
negative -  no rash

## 2022-11-01 ENCOUNTER — OFFICE VISIT (OUTPATIENT)
Dept: SURGERY | Facility: CLINIC | Age: 67
End: 2022-11-01

## 2022-11-01 VITALS
DIASTOLIC BLOOD PRESSURE: 78 MMHG | WEIGHT: 190.6 LBS | TEMPERATURE: 98.5 F | BODY MASS INDEX: 30.63 KG/M2 | HEART RATE: 117 BPM | HEIGHT: 66 IN | OXYGEN SATURATION: 96 % | SYSTOLIC BLOOD PRESSURE: 122 MMHG | RESPIRATION RATE: 18 BRPM

## 2022-11-01 DIAGNOSIS — I10 HYPERTENSION, UNSPECIFIED TYPE: ICD-10-CM

## 2022-11-01 DIAGNOSIS — K40.20 NON-RECURRENT BILATERAL INGUINAL HERNIA WITHOUT OBSTRUCTION OR GANGRENE: Primary | ICD-10-CM

## 2022-11-01 DIAGNOSIS — Z87.19 H/O DIVERTICULITIS OF COLON: ICD-10-CM

## 2022-11-01 DIAGNOSIS — E11.69 TYPE 2 DIABETES MELLITUS WITH OTHER SPECIFIED COMPLICATION, UNSPECIFIED WHETHER LONG TERM INSULIN USE (HCC): ICD-10-CM

## 2022-11-01 DIAGNOSIS — Z95.1 HX OF CABG: ICD-10-CM

## 2022-11-01 DIAGNOSIS — Z85.46 H/O PROSTATE CANCER: ICD-10-CM

## 2022-11-01 RX ORDER — AMOXICILLIN 500 MG/1
CAPSULE ORAL
COMMUNITY

## 2022-11-01 RX ORDER — ASPIRIN 81 MG/1
TABLET, CHEWABLE ORAL
COMMUNITY

## 2022-11-01 RX ORDER — LOSARTAN POTASSIUM AND HYDROCHLOROTHIAZIDE 12.5; 5 MG/1; MG/1
1 TABLET ORAL DAILY
COMMUNITY

## 2022-11-01 RX ORDER — METOPROLOL SUCCINATE 50 MG/1
1 TABLET, EXTENDED RELEASE ORAL DAILY
COMMUNITY
Start: 2022-06-11

## 2022-11-01 RX ORDER — OXYCODONE HYDROCHLORIDE AND ACETAMINOPHEN 5; 325 MG/1; MG/1
1 TABLET ORAL EVERY 4 HOURS PRN
COMMUNITY
Start: 2022-09-22

## 2022-11-01 RX ORDER — CELECOXIB 200 MG/1
200 CAPSULE ORAL 2 TIMES DAILY
COMMUNITY
Start: 2022-09-22

## 2022-11-01 RX ORDER — ATORVASTATIN CALCIUM 40 MG/1
1 TABLET, FILM COATED ORAL DAILY
COMMUNITY
Start: 2022-06-29

## 2022-11-01 RX ORDER — OLMESARTAN MEDOXOMIL AND HYDROCHLOROTHIAZIDE 20/12.5 20; 12.5 MG/1; MG/1
1 TABLET ORAL DAILY
COMMUNITY
Start: 2022-09-08 | End: 2022-11-01

## 2022-11-01 NOTE — PROGRESS NOTES
CT A/P 9/19/22: IMPRESSION:     Small bilateral fat-containing inguinal hernias      No acute inflammatory process identified  Assessment/Plan:     1  Non-recurrent bilateral inguinal hernia without obstruction or gangrene    2  Hx of CABG    3  Hypertension, unspecified type    4  Type 2 diabetes mellitus with other specified complication, unspecified whether long term insulin use ([address] )    5  H/O prostate cancer    6  H/O diverticulitis of colon      He has had a prior prostatectomy via low midline/suprapubic incision it appears his colon resection was also performed through the same), can perform laparoscopy and determine if we are able to proceed with laparoscopic repair  We discussed the risks and benefits of laparoscopic vs open bilateral inguinal hernia repair  As of now he feels the symptoms are manageable and would like to complete the PT for his knee  I think this is a good idea as it would have to be interrupted at least a few days after surgery and immobility will prolong his recovery from both  He would like to return in February for scheduling  Subjective:      Patient ID: Georgia García is a 79 y o  male  Triage Notes:    Mr Leonela Saldana is presenting with bilateral inguinal hernias  Lower abdominal pain on the right and sometimes on the left  Constipation postoperatively which has improved to going twice daily  9/22 Total knee replacement - Doing PT three times weekly  No end date planned as of yet  Other surgical history - rotator cuff about 2 years ago  prostatectomy 7 years  Also had colon resection for diverticulitis 2013  Cardiac bypass 2005, no recent chest pain or shortness of breath  Works as a   The following portions of the patient's history were reviewed and updated as appropriate:   He  has a past medical history of Diabetes ([address] ), Diverticulitis, HTN (hypertension), CABG, and Prostate cancer ([address] )    He There are no problems to display for this patient  He  has a past surgical history that includes Joint replacement  His family history is not on file  He  reports that he has never smoked  He has never used smokeless tobacco  He reports that he does not drink alcohol and does not use drugs  Current Outpatient Medications on File Prior to Visit   Medication Sig   • atorvastatin (LIPITOR) 40 mg tablet Take 1 tablet by mouth daily   • celecoxib (CeleBREX) 200 mg capsule Take 200 mg by mouth 2 (two) times a day   • metFORMIN (GLUCOPHAGE) 500 mg tablet Take 1 tablet by mouth 2 (two) times a day   • metoprolol succinate (TOPROL-XL) 50 mg 24 hr tablet Take 1 tablet by mouth daily   • oxyCODONE-acetaminophen (PERCOCET) 5-325 mg per tablet Take 1 tablet by mouth every 4 (four) hours as needed   • amoxicillin (AMOXIL) 500 mg capsule amoxicillin 500 mg capsule   • aspirin 81 mg chewable tablet Chew   • losartan-hydrochlorothiazide (HYZAAR) 50-12 5 mg per tablet Take 1 tablet by mouth in the morning     No current facility-administered medications on file prior to visit  He has No Known Allergies       Review of Systems   Constitutional: Negative for appetite change, chills, fever and unexpected weight change  HENT: Negative for hearing loss, sore throat, trouble swallowing and voice change  Eyes: Negative for visual disturbance  Respiratory: Negative for cough, chest tightness, shortness of breath and wheezing  Cardiovascular: Negative for chest pain and palpitations  Gastrointestinal: Positive for abdominal pain (as per HPI)  Negative for abdominal distention and blood in stool  Constipation: improved  Endocrine: Negative for cold intolerance and heat intolerance  Genitourinary: Negative for difficulty urinating and scrotal swelling  Musculoskeletal: Gait problem: walking with a cane after TKA  Skin: Negative for color change and rash  Neurological: Negative for dizziness, speech difficulty, light-headedness and numbness  Hematological: Does not bruise/bleed easily  Psychiatric/Behavioral: Negative for confusion, hallucinations and suicidal ideas  Objective:      /78   Pulse (!) 117 Comment: pt stated he was not anxious but barely slept last night  Temp 98 5 °F (36 9 °C) (Oral)   Resp 18   Ht 5' 6" (1 676 m)   Wt 86 5 kg (190 lb 9 6 oz)   SpO2 96%   BMI 30 76 kg/m²     Below is the patient's most recent value for Albumin, ALT, AST, BUN, Calcium, Chloride, Cholesterol, CO2, Creatinine, GFR, Glucose, HDL, Hematocrit, Hemoglobin, Hemoglobin A1C, LDL, Magnesium, Phosphorus, Platelets, Potassium, PSA, Sodium, Triglycerides, and WBC  Lab Results   Component Value Date    ALT 40 09/19/2022    AST 18 09/19/2022    BUN 26 (H) 09/19/2022    CALCIUM 8 7 09/19/2022     09/19/2022    CO2 26 09/19/2022    CREATININE 0 99 09/19/2022    HCT 40 0 09/19/2022    HGB 13 8 09/19/2022    HGBA1C 7 5 (H) 09/13/2021     09/19/2022    K 4 0 09/19/2022    WBC 4 26 (L) 09/19/2022     Note: for a comprehensive list of the patient's lab results, access the Results Review activity  Physical Exam  Vitals and nursing note reviewed  Constitutional:       General: He is not in acute distress  Appearance: He is well-developed  HENT:      Head: Normocephalic and atraumatic  Eyes:      General:         Right eye: No discharge  Left eye: No discharge  Neck:      Vascular: No JVD  Cardiovascular:      Rate and Rhythm: Normal rate and regular rhythm  Pulmonary:      Effort: Pulmonary effort is normal       Breath sounds: Normal breath sounds  Abdominal:      General: There is no distension  Palpations: Abdomen is soft  Tenderness: There is no abdominal tenderness  There is no guarding  Musculoskeletal:         General: Normal range of motion  Cervical back: Normal range of motion and neck supple  Skin:     General: Skin is warm and dry     Neurological:      Mental Status: He is alert and oriented to person, place, and time  Psychiatric:         Behavior: Behavior normal          Thought Content:  Thought content normal              Procedures

## 2022-11-24 ENCOUNTER — HOSPITAL ENCOUNTER (EMERGENCY)
Facility: HOSPITAL | Age: 67
Discharge: HOME | End: 2022-11-24
Attending: EMERGENCY MEDICINE

## 2022-11-24 ENCOUNTER — APPOINTMENT (EMERGENCY)
Dept: CT IMAGING | Facility: HOSPITAL | Age: 67
End: 2022-11-24

## 2022-11-24 VITALS
SYSTOLIC BLOOD PRESSURE: 140 MMHG | RESPIRATION RATE: 18 BRPM | DIASTOLIC BLOOD PRESSURE: 74 MMHG | OXYGEN SATURATION: 100 % | TEMPERATURE: 97.3 F | HEART RATE: 70 BPM

## 2022-11-24 DIAGNOSIS — K40.90 RIGHT INGUINAL HERNIA: Primary | ICD-10-CM

## 2022-11-24 LAB
ALBUMIN SERPL BCP-MCNC: 4.2 G/DL (ref 3.5–5)
ALP SERPL-CCNC: 98 U/L (ref 46–116)
ALT SERPL W P-5'-P-CCNC: 30 U/L (ref 12–78)
ANION GAP SERPL CALCULATED.3IONS-SCNC: 11 MMOL/L (ref 4–13)
AST SERPL W P-5'-P-CCNC: 17 U/L (ref 5–45)
BACTERIA UR QL AUTO: NORMAL /HPF
BASOPHILS # BLD AUTO: 0.02 THOUSANDS/ÂΜL (ref 0–0.1)
BASOPHILS NFR BLD AUTO: 1 % (ref 0–1)
BILIRUB SERPL-MCNC: 0.59 MG/DL (ref 0.2–1)
BILIRUB UR QL STRIP: NEGATIVE
BUN SERPL-MCNC: 19 MG/DL (ref 5–25)
CALCIUM SERPL-MCNC: 9.3 MG/DL (ref 8.3–10.1)
CHLORIDE SERPL-SCNC: 101 MMOL/L (ref 96–108)
CLARITY UR: CLEAR
CO2 SERPL-SCNC: 27 MMOL/L (ref 21–32)
COLOR UR: ABNORMAL
CREAT SERPL-MCNC: 1.17 MG/DL (ref 0.6–1.3)
EOSINOPHIL # BLD AUTO: 0.04 THOUSAND/ÂΜL (ref 0–0.61)
EOSINOPHIL NFR BLD AUTO: 1 % (ref 0–6)
ERYTHROCYTE [DISTWIDTH] IN BLOOD BY AUTOMATED COUNT: 12.3 % (ref 11.6–15.1)
GFR SERPL CREATININE-BSD FRML MDRD: 64 ML/MIN/1.73SQ M
GLUCOSE SERPL-MCNC: 346 MG/DL (ref 65–140)
GLUCOSE UR STRIP-MCNC: ABNORMAL MG/DL
HCT VFR BLD AUTO: 42.7 % (ref 36.5–49.3)
HGB BLD-MCNC: 14.8 G/DL (ref 12–17)
HGB UR QL STRIP.AUTO: NEGATIVE
IMM GRANULOCYTES # BLD AUTO: 0.02 THOUSAND/UL (ref 0–0.2)
IMM GRANULOCYTES NFR BLD AUTO: 1 % (ref 0–2)
KETONES UR STRIP-MCNC: ABNORMAL MG/DL
LEUKOCYTE ESTERASE UR QL STRIP: ABNORMAL
LYMPHOCYTES # BLD AUTO: 0.98 THOUSANDS/ÂΜL (ref 0.6–4.47)
LYMPHOCYTES NFR BLD AUTO: 26 % (ref 14–44)
MCH RBC QN AUTO: 30.4 PG (ref 26.8–34.3)
MCHC RBC AUTO-ENTMCNC: 34.7 G/DL (ref 31.4–37.4)
MCV RBC AUTO: 88 FL (ref 82–98)
MONOCYTES # BLD AUTO: 0.24 THOUSAND/ÂΜL (ref 0.17–1.22)
MONOCYTES NFR BLD AUTO: 6 % (ref 4–12)
NEUTROPHILS # BLD AUTO: 2.48 THOUSANDS/ÂΜL (ref 1.85–7.62)
NEUTS SEG NFR BLD AUTO: 65 % (ref 43–75)
NITRITE UR QL STRIP: NEGATIVE
NON-SQ EPI CELLS URNS QL MICRO: NORMAL /HPF
NRBC BLD AUTO-RTO: 0 /100 WBCS
PH UR STRIP.AUTO: 5 [PH]
PLATELET # BLD AUTO: 163 THOUSANDS/UL (ref 149–390)
PMV BLD AUTO: 10.3 FL (ref 8.9–12.7)
POTASSIUM SERPL-SCNC: 4 MMOL/L (ref 3.5–5.3)
PROT SERPL-MCNC: 7.2 G/DL (ref 6.4–8.4)
PROT UR STRIP-MCNC: NEGATIVE MG/DL
RBC # BLD AUTO: 4.87 MILLION/UL (ref 3.88–5.62)
RBC #/AREA URNS AUTO: NORMAL /HPF
SODIUM SERPL-SCNC: 139 MMOL/L (ref 135–147)
SP GR UR STRIP.AUTO: 1.03 (ref 1–1.03)
UROBILINOGEN UR STRIP-ACNC: <2 MG/DL
WBC # BLD AUTO: 3.78 THOUSAND/UL (ref 4.31–10.16)
WBC #/AREA URNS AUTO: NORMAL /HPF

## 2022-11-24 RX ORDER — NAPROXEN 250 MG/1
250 TABLET ORAL ONCE
Status: COMPLETED | OUTPATIENT
Start: 2022-11-24 | End: 2022-11-24

## 2022-11-24 RX ADMIN — IOHEXOL 70 ML: 350 INJECTION, SOLUTION INTRAVENOUS at 15:05

## 2022-11-24 RX ADMIN — NAPROXEN 250 MG: 250 TABLET ORAL at 15:49

## 2022-11-24 NOTE — DISCHARGE INSTRUCTIONS
Apply ice to affected area  Take Tylenol and ibuprofen as needed for pain  Avoid heavy lifting until pain improves  Please follow-up with Dr Jasso Necessary if symptoms persist  Return to the ER with any worsening symptoms

## 2022-11-24 NOTE — ED PROVIDER NOTES
History  Chief Complaint   Patient presents with   • Abdominal Pain     REPORTS HX R INGUINAL HERNIA 3 MONTHS AGO, AND STARTED WITH PAIN TO SITE, DENIES N/V/D/CONSTIPATION  DENIES FEVERS OR CHILLS     70yo male with a history of coronary artery disease s/p CABG, hypertension, type 2 diabetes, obesity, history of prostate cancer s/p prostatectomy, and an inguinal hernia presenting for evaluation of right inguinal pain x 4-5 hours  Patient was helping his wife prepare Thanksgiving dinner when he started to experience right inguinal discomfort in the area of his hernia  He took Aleve with improvement but then the pain recurred  He denies any nausea, vomiting, difficulty urinating or constipation  Last BM was 4 hours ago  Patient had a CT abdomen in September 2022 which revealed "Small bilateral fat-containing inguinal hernias " He saw a general surgeon as an outpatient and plan was for surveillance and follow-up in February  History provided by:  Patient and medical records   used: No    Abdominal Pain  Pain location: R inguinal region  Pain quality: aching    Pain radiates to:  Does not radiate  Pain severity:  Moderate  Onset quality:  Gradual  Duration:  5 hours  Timing:  Constant  Progression:  Unchanged  Chronicity:  New  Relieved by:  Nothing  Worsened by:  Palpation  Associated symptoms: no chest pain, no chills, no constipation, no diarrhea, no dysuria, no fever, no nausea, no shortness of breath and no vomiting        Prior to Admission Medications   Prescriptions Last Dose Informant Patient Reported?  Taking?   amoxicillin (AMOXIL) 500 mg capsule   Yes No   Sig: amoxicillin 500 mg capsule   aspirin 81 mg chewable tablet   Yes No   Sig: Chew   atorvastatin (LIPITOR) 40 mg tablet   Yes No   Sig: Take 1 tablet by mouth daily   celecoxib (CeleBREX) 200 mg capsule   Yes No   Sig: Take 200 mg by mouth 2 (two) times a day   losartan-hydrochlorothiazide (HYZAAR) 50-12 5 mg per tablet   Yes No   Sig: Take 1 tablet by mouth in the morning   metFORMIN (GLUCOPHAGE) 500 mg tablet   Yes No   Sig: Take 1 tablet by mouth 2 (two) times a day   metoprolol succinate (TOPROL-XL) 50 mg 24 hr tablet   Yes No   Sig: Take 1 tablet by mouth daily   oxyCODONE-acetaminophen (PERCOCET) 5-325 mg per tablet   Yes No   Sig: Take 1 tablet by mouth every 4 (four) hours as needed      Facility-Administered Medications: None       Past Medical History:   Diagnosis Date   • Diabetes ([facility] [address] )    • Diverticulitis    • HTN (hypertension)    • Hx of CABG    • Inguinal hernia     R   • Prostate cancer [facility])        Past Surgical History:   Procedure Laterality Date   • JOINT REPLACEMENT         No family history on file  I have reviewed and agree with the history as documented  E-Cigarette/Vaping     E-Cigarette/Vaping Substances     Social History     Tobacco Use   • Smoking status: Never   • Smokeless tobacco: Never   Substance Use Topics   • Alcohol use: Never   • Drug use: Never       Review of Systems   Constitutional: Negative for chills and fever  HENT: Negative for drooling and voice change  Eyes: Negative for discharge and redness  Respiratory: Negative for shortness of breath and stridor  Cardiovascular: Negative for chest pain and leg swelling  Gastrointestinal: Positive for abdominal pain  Negative for constipation, diarrhea, nausea and vomiting  Genitourinary: Negative for difficulty urinating, dysuria and testicular pain  +Groin pain   Musculoskeletal: Negative for neck pain and neck stiffness  Skin: Negative for color change and rash  Neurological: Negative for seizures and syncope  Psychiatric/Behavioral: Negative for confusion  The patient is not nervous/anxious  All other systems reviewed and are negative  Physical Exam  Physical Exam  Vitals and nursing note reviewed  Constitutional:       General: He is not in acute distress  Appearance: Normal appearance   He is not toxic-appearing  HENT:      Head: Normocephalic and atraumatic  Right Ear: External ear normal       Left Ear: External ear normal    Eyes:      General: No scleral icterus  Right eye: No discharge  Left eye: No discharge  Conjunctiva/sclera: Conjunctivae normal    Cardiovascular:      Rate and Rhythm: Normal rate  Pulmonary:      Effort: Pulmonary effort is normal  No respiratory distress  Breath sounds: No stridor  Abdominal:      Tenderness: There is abdominal tenderness (R inguinal region)  There is no guarding or rebound  Comments: There is tenderness in the right inguinal area without obvious hernia   Musculoskeletal:         General: No deformity  Normal range of motion  Cervical back: Normal range of motion  Skin:     General: Skin is warm and dry  Neurological:      General: No focal deficit present  Mental Status: He is alert  Mental status is at baseline     Psychiatric:         Mood and Affect: Mood normal          Behavior: Behavior normal          Vital Signs  ED Triage Vitals [11/24/22 1409]   Temperature Pulse Respirations Blood Pressure SpO2   (!) 97 3 °F (36 3 °C) (!) 122 14 168/88 94 %      Temp Source Heart Rate Source Patient Position - Orthostatic VS BP Location FiO2 (%)   Tympanic Monitor Sitting Right arm --      Pain Score       7           Vitals:    11/24/22 1409 11/24/22 1601   BP: 168/88 140/74   Pulse: (!) 122 70   Patient Position - Orthostatic VS: Sitting Sitting         Visual Acuity      ED Medications  Medications   iohexol (OMNIPAQUE) 350 MG/ML injection (SINGLE-DOSE) 70 mL (70 mL Intravenous Given 11/24/22 1505)   naproxen (NAPROSYN) tablet 250 mg (250 mg Oral Given 11/24/22 1549)       Diagnostic Studies  Results Reviewed     Procedure Component Value Units Date/Time    Comprehensive metabolic panel [[id number]]  (Abnormal) Collected: 11/24/22 1420    Lab Status: Final result Specimen: Blood from Arm, Right Updated: 11/24/22 1445     Sodium 139 mmol/L      Potassium 4 0 mmol/L      Chloride 101 mmol/L      CO2 27 mmol/L      ANION GAP 11 mmol/L      BUN 19 mg/dL      Creatinine 1 17 mg/dL      Glucose 346 mg/dL      Calcium 9 3 mg/dL      AST 17 U/L      ALT 30 U/L      Alkaline Phosphatase 98 U/L      Total Protein 7 2 g/dL      Albumin 4 2 g/dL      Total Bilirubin 0 59 mg/dL      eGFR 64 ml/min/1 73sq m     Narrative:      [place] guidelines for Chronic Kidney Disease (CKD):   •  Stage 1 with normal or high GFR (GFR > 90 mL/min/1 73 square meters)  •  Stage 2 Mild CKD (GFR = 60-89 mL/min/1 73 square meters)  •  Stage 3A Moderate CKD (GFR = 45-59 mL/min/1 73 square meters)  •  Stage 3B Moderate CKD (GFR = 30-44 mL/min/1 73 square meters)  •  Stage 4 Severe CKD (GFR = 15-29 mL/min/1 73 square meters)  •  Stage 5 End Stage CKD (GFR <15 mL/min/1 73 square meters)  Note: GFR calculation is accurate only with a steady state creatinine    Urine Microscopic [[id number]]  (Normal) Collected: 11/24/22 1425    Lab Status: Final result Specimen: Urine, Clean Catch Updated: 11/24/22 1443     RBC, UA None Seen /hpf      WBC, UA None Seen /hpf      Epithelial Cells None Seen /hpf      Bacteria, UA None Seen /hpf     UA (URINE) with reflex to Scope [[id number]]  (Abnormal) Collected: 11/24/22 1425    Lab Status: Final result Specimen: Urine, Clean Catch Updated: 11/24/22 1438     Color, UA Light Yellow     Clarity, UA Clear     Specific Gravity, UA 1 028     pH, UA 5 0     Leukocytes, UA Elevated glucose may cause decreased leukocyte values   See urine microscopic for [facility] result/     Nitrite, UA Negative     Protein, UA Negative mg/dl      Glucose, UA >=1000 (1%) mg/dl      Ketones, UA Trace mg/dl      Urobilinogen, UA <2 0 mg/dl      Bilirubin, UA Negative     Occult Blood, UA Negative    CBC and differential [[id number]]  (Abnormal) Collected: 11/24/22 1420    Lab Status: Final result Specimen: Blood from Arm, Right Updated: 11/24/22 1429     WBC 3 78 Thousand/uL      RBC 4 87 Million/uL      Hemoglobin 14 8 g/dL      Hematocrit 42 7 %      MCV 88 fL      MCH 30 4 pg      MCHC 34 7 g/dL      RDW 12 3 %      MPV 10 3 fL      Platelets 351 Thousands/uL      nRBC 0 /100 WBCs      Neutrophils Relative 65 %      Immat GRANS % 1 %      Lymphocytes Relative 26 %      Monocytes Relative 6 %      Eosinophils Relative 1 %      Basophils Relative 1 %      Neutrophils Absolute 2 48 Thousands/µL      Immature Grans Absolute 0 02 Thousand/uL      Lymphocytes Absolute 0 98 Thousands/µL      Monocytes Absolute 0 24 Thousand/µL      Eosinophils Absolute 0 04 Thousand/µL      Basophils Absolute 0 02 Thousands/µL                  CT abdomen pelvis with contrast   Final Result by Sherice Waldron MD (11/24 1522)      Findings compatible with constipation  No evidence of bowel obstruction, colitis, diverticulitis or appendicitis  Workstation performed: [id number]                    Procedures  Procedures         ED Course               Identification of Seniors at Risk    Michael See Most Recent Value   (ISAR) Identification of Seniors at Risk    Before the illness or injury that brought you to the Emergency, did you need someone to help you on a regular basis? 0 Filed at: 11/24/2022 1408   In the last 24 hours, have you needed more help than usual? 0 Filed at: 11/24/2022 1408   Have you been hospitalized for one or more nights during the past 6 months? 0 Filed at: 11/24/2022 1408   In general, do you see well? 0 Filed at: 11/24/2022 1408   In general, do you have serious problems with your memory?  0 Filed at: 11/24/2022 1408   Do you take more than three different medications every day? 0 Filed at: 11/24/2022 1408   ISAR Score 0 Filed at: 11/24/2022 1408                      MDM  Number of Diagnoses or Management Options  Right inguinal hernia: new and requires workup  Diagnosis management comments: 70yo male presenting for right inguinal pain x several hours  Started hurting while getting ready for Thanksgiving  Hx of fat containing inguinal hernia  No obstructive symptoms  Last BM 4 hours ago  He is tachycardic on arrival with otherwise normal vital signs  Patient is well appearing in no distress  He has right inguinal tenderness without obvious hernia  Initial ED plan: Check CBC, CMP, UA, and CT abdomen  Will place ice on area  Final assessment:  Labs reveal hyperglycemia with a glucose of 346  He admits to eating a lot of sweets today for Thanksgiving  Bicarb and anion gap normal, no signs of DKA  Remainder of labs are overall unremarkable  UA without microscopic hematuria or signs of infection  CT only shows evidence of constipation  No significant abnormality in the right inguinal region  Tachycardia resolved on re-evaluation  No indication for admission  Advised ice, rest, and p r n  NSAIDs  Advised follow-up with general surgery  ED return precautions discussed  Patient expressed understanding and is agreeable to plan  Patient discharged in stable condition           Amount and/or Complexity of Data Reviewed  Clinical lab tests: ordered and reviewed  Tests in the radiology section of CPT®: ordered and reviewed  Review and summarize past medical records: yes  Independent visualization of images, tracings, or specimens: yes    Risk of Complications, Morbidity, and/or Mortality  Presenting problems: moderate  Diagnostic procedures: moderate  Management options: moderate    Patient Progress  Patient progress: stable      Disposition  Final diagnoses:   Right inguinal hernia     Time reflects when diagnosis was documented in both MDM as applicable and the Disposition within this note     Time User Action Codes Description Comment    11/24/2022  3:39 PM [address] [place] [address] [K40 [id number]] Right inguinal hernia       ED Disposition     ED Disposition   Discharge    Condition   Stable    Date/Time   Thu Nov 24, 2022  3:39 PM Comment   [facility] discharge to home/self care  Follow-up Information     Follow up With Specialties Details Why Contact Info Additional Information    Montel Ahumada, MD General Surgery Schedule an appointment as soon as possible for a visit   Dragan Rodriguez [address] [place] [address] [phone] [facility] Emergency Department Emergency Medicine  If symptoms worsen [address] Emergency Department, [address] [place] [address], [facility]          Discharge Medication List as of 11/24/2022  3:40 PM      CONTINUE these medications which have NOT CHANGED    Details   amoxicillin (AMOXIL) 500 mg capsule amoxicillin 500 mg capsule, Historical Med      aspirin 81 mg chewable tablet Chew, Historical Med      atorvastatin (LIPITOR) 40 mg tablet Take 1 tablet by mouth daily, Starting Wed 6/29/2022, Historical Med      celecoxib (CeleBREX) 200 mg capsule Take 200 mg by mouth 2 (two) times a day, Starting Thu 9/22/2022, Historical Med      losartan-hydrochlorothiazide (HYZAAR) 50-12 5 mg per tablet Take 1 tablet by mouth in the morning, Historical Med      metFORMIN (GLUCOPHAGE) 500 mg tablet Take 1 tablet by mouth 2 (two) times a day, Starting Thu 6/9/2022, Historical Med      metoprolol succinate (TOPROL-XL) 50 mg 24 hr tablet Take 1 tablet by mouth daily, Starting Sat 6/11/2022, Historical Med      oxyCODONE-acetaminophen (PERCOCET) 5-325 mg per tablet Take 1 tablet by mouth every 4 (four) hours as needed, Starting Thu 9/22/2022, Historical Med             No discharge procedures on file      PDMP Review     None          ED Provider  Electronically Signed by           Sandra Hurd PA-C  11/24/22 9127

## 2022-12-16 ENCOUNTER — TELEPHONE (OUTPATIENT)
Dept: SURGERY | Facility: CLINIC | Age: 67
End: 2022-12-16

## 2022-12-16 NOTE — TELEPHONE ENCOUNTER
Patient of Dr Boyd spouse Timoteo Luna called office  she states patient is having a lot of pain and a burning sensation on (R) side of groin area  Timoteo Luna states Dr Boyd advised him to call office if he has any pain and gets worst patient has an appointment scheduled for 02/01/23 for follow up  Timoteo Luna Phone # [phone]  Please Advise

## 2022-12-16 NOTE — TELEPHONE ENCOUNTER
He was planning to have the repair after he completed the PT for his knee but would they like to go ahead and get it scheduled sooner since he is having pain?  If so we can work on that for him

## 2022-12-16 NOTE — TELEPHONE ENCOUNTER
Called spoke with patient advised patient as per Dr Boyd, she was planning to schedule the repair after he completed his PT for his knee  as per Dr Boyd if patient would like to go ahead with surgery we can get him scheduled sooner since he is having pain  patient states he will like to move forward and schedule surgery as soon as possible  he also states he is currently taking Aleve which helps a little but not enough he will like to know if Dr Boyd can prescribe anything for pain meantime  Please Advise

## 2023-01-03 ENCOUNTER — TELEPHONE (OUTPATIENT)
Dept: SURGERY | Facility: CLINIC | Age: 68
End: 2023-01-03

## 2023-01-03 NOTE — TELEPHONE ENCOUNTER
Pt's wife called leaving a message on my VM asking about a call back to set up surgery for his inguinal hernia  After reviewing his chart I see there are telephone encounters where Dr Rita Merrill stated pt's A-1C is elevated and needs to f/u with his PCP to bring this down before proceeding  Pt was spoken to by [facility] who also put a telephone encounter indicating she informed the pt himself of this  I called wife back leaving a detailed VM of this information

## 2023-02-02 ENCOUNTER — OFFICE VISIT (OUTPATIENT)
Dept: SURGERY | Facility: CLINIC | Age: 68
End: 2023-02-02

## 2023-02-02 VITALS
HEIGHT: 66 IN | RESPIRATION RATE: 16 BRPM | TEMPERATURE: 98 F | BODY MASS INDEX: 31.82 KG/M2 | HEART RATE: 96 BPM | DIASTOLIC BLOOD PRESSURE: 90 MMHG | OXYGEN SATURATION: 92 % | WEIGHT: 198 LBS | SYSTOLIC BLOOD PRESSURE: 128 MMHG

## 2023-02-02 DIAGNOSIS — K40.20 NON-RECURRENT BILATERAL INGUINAL HERNIA WITHOUT OBSTRUCTION OR GANGRENE: Primary | ICD-10-CM

## 2023-02-02 RX ORDER — OLMESARTAN MEDOXOMIL AND HYDROCHLOROTHIAZIDE 20/12.5 20; 12.5 MG/1; MG/1
1 TABLET ORAL DAILY
COMMUNITY
Start: 2022-12-12 | End: 2023-02-02

## 2023-02-02 NOTE — PROGRESS NOTES
Assessment/Plan:     1  Non-recurrent bilateral inguinal hernia without obstruction or gangrene      We will reach out to his PCP regarding the timing of potential medical clearance for repair after his a1c and diabetic management is reassessed otherwise he is at unacceptable risk for wound infection and other complications  Subjective:      Patient ID: Ramon Barbosa is a 79 y o  male  Triage Notes:    Mr Jose Daniel Chaparro is presenting with bilateral inguinal hernias  Right side is symptomatic  He was first seen 11/1 and elected to defer fixing until he had completed PT for his knee replacement  Still doing physical therapy for his left knee  In December he contacted the office regarding increasing pain/symptoms  However his A1c at the time was 8 6 (up from 7 5 the year prior) which is a contraindication  He did see his PCP on 1/4 in order to address uncontrolled diabetes  His metformin was increased to bid and they discussed dietary control  He has been using a hernia belt which is helping  He is unsure when he has his next followup labs/visit for DM  The following portions of the patient's history were reviewed and updated as appropriate: allergies, current medications, past family history, past medical history, past social history, past surgical history and problem list     Review of Systems   Constitutional: Negative for appetite change, chills, fever and unexpected weight change  HENT: Negative for hearing loss, sore throat, trouble swallowing and voice change  Eyes: Negative for visual disturbance  Respiratory: Negative for cough, chest tightness, shortness of breath and wheezing  Cardiovascular: Negative for chest pain and palpitations  Gastrointestinal: Positive for abdominal pain (as per HPI )  Negative for abdominal distention and blood in stool  Constipation: improved  Endocrine: Negative for cold intolerance and heat intolerance     Genitourinary: Negative for difficulty urinating and scrotal swelling  Musculoskeletal: Gait problem: walking with a cane after TKA    Skin: Negative for color change and rash  Neurological: Negative for dizziness, speech difficulty, light-headedness and numbness  Hematological: Does not bruise/bleed easily  Psychiatric/Behavioral: Negative for confusion, hallucinations and suicidal ideas  Objective:      /90   Pulse 96   Temp 98 °F (36 7 °C) (Temporal)   Resp 16   Ht 5' 6" (1 676 m)   Wt 89 8 kg (198 lb)   SpO2 92%   BMI 31 96 kg/m²     Below is the patient's most recent value for Albumin, ALT, AST, BUN, Calcium, Chloride, Cholesterol, CO2, Creatinine, GFR, Glucose, HDL, Hematocrit, Hemoglobin, Hemoglobin A1C, LDL, Magnesium, Phosphorus, Platelets, Potassium, PSA, Sodium, Triglycerides, and WBC  Lab Results   Component Value Date    ALT 30 11/24/2022    AST 17 11/24/2022    BUN 19 11/24/2022    CALCIUM 9 3 11/24/2022     11/24/2022    CO2 27 11/24/2022    CREATININE 1 17 11/24/2022    HCT 42 7 11/24/2022    HGB 14 8 11/24/2022    HGBA1C 8 6 (H) 11/14/2022     11/24/2022    K 4 0 11/24/2022    WBC 3 78 (L) 11/24/2022     Note: for a comprehensive list of the patient's lab results, access the Results Review activity  Physical Exam  Vitals and nursing note reviewed  Constitutional:       General: He is not in acute distress  Appearance: He is well-developed  HENT:      Head: Normocephalic and atraumatic  Eyes:      General: No scleral icterus  Right eye: No discharge  Left eye: No discharge  Neck:      Vascular: No JVD  Cardiovascular:      Rate and Rhythm: Normal rate and regular rhythm  Pulmonary:      Effort: Pulmonary effort is normal  No respiratory distress  Abdominal:      General: Abdomen is flat  There is no distension  Genitourinary:     Comments: deferred  Musculoskeletal:         General: Normal range of motion  Cervical back: Normal range of motion and neck supple  Skin:     General: Skin is warm and dry  Neurological:      Mental Status: He is alert and oriented to person, place, and time  Psychiatric:         Mood and Affect: Mood normal          Behavior: Behavior normal          Thought Content:  Thought content normal          Judgment: Judgment normal              Procedures

## 2023-04-12 ENCOUNTER — HOSPITAL ENCOUNTER (EMERGENCY)
Facility: HOSPITAL | Age: 68
Discharge: HOME | End: 2023-04-12
Attending: EMERGENCY MEDICINE

## 2023-04-12 ENCOUNTER — APPOINTMENT (EMERGENCY)
Dept: CT IMAGING | Facility: HOSPITAL | Age: 68
End: 2023-04-12

## 2023-04-12 VITALS
SYSTOLIC BLOOD PRESSURE: 149 MMHG | DIASTOLIC BLOOD PRESSURE: 86 MMHG | RESPIRATION RATE: 18 BRPM | OXYGEN SATURATION: 97 % | HEART RATE: 78 BPM | TEMPERATURE: 98 F

## 2023-04-12 DIAGNOSIS — M54.2 NECK PAIN: Primary | ICD-10-CM

## 2023-04-12 RX ORDER — KETOROLAC TROMETHAMINE 30 MG/ML
15 INJECTION, SOLUTION INTRAMUSCULAR; INTRAVENOUS ONCE
Status: COMPLETED | OUTPATIENT
Start: 2023-04-12 | End: 2023-04-12

## 2023-04-12 RX ORDER — METHOCARBAMOL 500 MG/1
500 TABLET, FILM COATED ORAL ONCE
Status: COMPLETED | OUTPATIENT
Start: 2023-04-12 | End: 2023-04-12

## 2023-04-12 RX ORDER — METHOCARBAMOL 500 MG/1
500 TABLET, FILM COATED ORAL 2 TIMES DAILY
Qty: 20 TABLET | Refills: 0 | Status: SHIPPED | OUTPATIENT
Start: 2023-04-12

## 2023-04-12 RX ORDER — IBUPROFEN 800 MG/1
800 TABLET ORAL 3 TIMES DAILY
Qty: 21 TABLET | Refills: 0 | Status: SHIPPED | OUTPATIENT
Start: 2023-04-12

## 2023-04-12 RX ADMIN — KETOROLAC TROMETHAMINE 15 MG: 30 INJECTION, SOLUTION INTRAMUSCULAR; INTRAVENOUS at 01:33

## 2023-04-12 RX ADMIN — METHOCARBAMOL 500 MG: 500 TABLET ORAL at 01:33

## 2023-04-25 NOTE — ED PROVIDER NOTES
History  Chief Complaint   Patient presents with   • Neck Pain     Patient c/o left sided neck pain x 3 days, pain worsening each day  Denies injury  /      49-year-old male presented to the emergency department for evaluation of neck pain  Patient describes aching left-sided neck pain with radiation into his left shoulder, it is sore and stiff, worse with moving his neck and upper extremity on that side  There is no numbness weakness or tingling  No injury or inciting event that he can recall  Prior to Admission Medications   Prescriptions Last Dose Informant Patient Reported? Taking?   amoxicillin (AMOXIL) 500 mg capsule   Yes No   Sig: amoxicillin 500 mg capsule   aspirin 81 mg chewable tablet   Yes No   Sig: Chew   atorvastatin (LIPITOR) 40 mg tablet   Yes No   Sig: Take 1 tablet by mouth daily   celecoxib (CeleBREX) 200 mg capsule   Yes No   Sig: Take 200 mg by mouth 2 (two) times a day   losartan-hydrochlorothiazide (HYZAAR) 50-12 5 mg per tablet   Yes No   Sig: Take 1 tablet by mouth in the morning   metFORMIN (GLUCOPHAGE) 500 mg tablet   Yes No   Sig: Take 1 tablet by mouth 2 (two) times a day   metoprolol succinate (TOPROL-XL) 50 mg 24 hr tablet   Yes No   Sig: Take 1 tablet by mouth daily   oxyCODONE-acetaminophen (PERCOCET) 5-325 mg per tablet   Yes No   Sig: Take 1 tablet by mouth every 4 (four) hours as needed      Facility-Administered Medications: None       Past Medical History:   Diagnosis Date   • Cancer ([facility] )     prostate   • Diabetes ([facility] )    • Diabetes mellitus ([facility] )    • Diverticulitis    • HTN (hypertension)    • Hx of CABG    • Hypertension    • Inguinal hernia     R   • Prostate cancer [facility])        Past Surgical History:   Procedure Laterality Date   • COLONOSCOPY     • JOINT REPLACEMENT     • LAPAROSCOPIC COLON RESECTION         No family history on file  I have reviewed and agree with the history as documented      E-Cigarette/Vaping   • E-Cigarette Use Never User E-Cigarette/Vaping Substances     Social History     Tobacco Use   • Smoking status: Never   • Smokeless tobacco: Never   Vaping Use   • Vaping Use: Never used   Substance Use Topics   • Alcohol use: Never   • Drug use: Never       Review of Systems   Constitutional: Negative for appetite change, chills, fatigue and fever  HENT: Negative for sneezing and sore throat  Eyes: Negative for visual disturbance  Respiratory: Negative for cough, choking, chest tightness, shortness of breath and wheezing  Cardiovascular: Negative for chest pain and palpitations  Gastrointestinal: Negative for abdominal pain, constipation, diarrhea, nausea and vomiting  Genitourinary: Negative for difficulty urinating and dysuria  Musculoskeletal: Positive for myalgias and neck pain  Negative for neck stiffness  Neurological: Negative for dizziness, weakness, light-headedness, numbness and headaches  All other systems reviewed and are negative  Physical Exam  Physical Exam  Vitals and nursing note reviewed  Constitutional:       General: He is not in acute distress  Appearance: He is well-developed  He is not diaphoretic  HENT:      Head: Normocephalic and atraumatic  Eyes:      Pupils: Pupils are equal, round, and reactive to light  Neck:      Vascular: No JVD  Trachea: No tracheal deviation  Cardiovascular:      Rate and Rhythm: Normal rate and regular rhythm  Heart sounds: Normal heart sounds  No murmur heard  No friction rub  No gallop  Pulmonary:      Effort: Pulmonary effort is normal  No respiratory distress  Breath sounds: Normal breath sounds  No wheezing or rales  Abdominal:      General: Bowel sounds are normal  There is no distension  Palpations: Abdomen is soft  Tenderness: There is no abdominal tenderness  There is no guarding or rebound  Musculoskeletal:      Comments:  There is tenderness of the paracervical muscles on the left side and the muscles of the trapezius  Skin:     General: Skin is warm and dry  Coloration: Skin is not pale  Neurological:      Mental Status: He is alert and oriented to person, place, and time  Cranial Nerves: No cranial nerve deficit  Motor: No abnormal muscle tone  Psychiatric:         Behavior: Behavior normal          Vital Signs  ED Triage Vitals   Temperature Pulse Respirations Blood Pressure SpO2   04/12/23 0011 04/12/23 0011 04/12/23 0011 04/12/23 0011 04/12/23 0011   98 °F (36 7 °C) 78 18 149/86 97 %      Temp Source Heart Rate Source Patient Position - Orthostatic VS BP Location FiO2 (%)   04/12/23 0011 04/12/23 0011 -- -- --   Oral Monitor         Pain Score       04/12/23 0133       5           Vitals:    04/12/23 0011   BP: 149/86   Pulse: 78         Visual Acuity      ED Medications  Medications   ketorolac (TORADOL) injection 15 mg (15 mg Intramuscular Given 4/12/23 0133)   methocarbamol (ROBAXIN) tablet 500 mg (500 mg Oral Given 4/12/23 0133)       Diagnostic Studies  Results Reviewed     None                 CT spine cervical without contrast   Final Result by Lindy Montejo DO (04/12 6962)      No cervical spine fracture or traumatic malalignment  Workstation performed: [id number]                    Procedures  Procedures         ED Course                                             Medical Decision Making  59-year-old male with subacute neck pain  Most likely musculoskeletal strain, will check CT may try NSAIDs, muscle relaxers, follow-up with PCP/Ortho  Amount and/or Complexity of Data Reviewed  Radiology: ordered  Risk  Prescription drug management            Disposition  Final diagnoses:   Neck pain     Time reflects when diagnosis was documented in both MDM as applicable and the Disposition within this note     Time User Action Codes Description Comment    4/12/2023  3:09 AM Diane Rawls Add [M54 2] Neck pain       ED Disposition     ED Disposition   Discharge Condition   Stable    Date/Time   Wed Apr 12, 2023  3:09 AM    Comment   Michelle Lo discharge to home/self care  Follow-up Information     Follow up With Specialties Details Why Contact Elpidio Shaw DO Internal Medicine   [id number] [place] [address]  [phone]            Discharge Medication List as of 4/12/2023  3:10 AM      CONTINUE these medications which have NOT CHANGED    Details   amoxicillin (AMOXIL) 500 mg capsule amoxicillin 500 mg capsule, Historical Med      aspirin 81 mg chewable tablet Chew, Historical Med      atorvastatin (LIPITOR) 40 mg tablet Take 1 tablet by mouth daily, Starting Wed 6/29/2022, Historical Med      celecoxib (CeleBREX) 200 mg capsule Take 200 mg by mouth 2 (two) times a day, Starting Thu 9/22/2022, Historical Med      losartan-hydrochlorothiazide (HYZAAR) 50-12 5 mg per tablet Take 1 tablet by mouth in the morning, Historical Med      metFORMIN (GLUCOPHAGE) 500 mg tablet Take 1 tablet by mouth 2 (two) times a day, Starting Thu 6/9/2022, Historical Med      metoprolol succinate (TOPROL-XL) 50 mg 24 hr tablet Take 1 tablet by mouth daily, Starting Sat 6/11/2022, Historical Med      oxyCODONE-acetaminophen (PERCOCET) 5-325 mg per tablet Take 1 tablet by mouth every 4 (four) hours as needed, Starting Thu 9/22/2022, Historical Med             No discharge procedures on file      PDMP Review     None          ED Provider  Electronically Signed by           Antony Robledo MD  04/25/23 7703

## 2023-05-24 ENCOUNTER — HOSPITAL ENCOUNTER (EMERGENCY)
Facility: HOSPITAL | Age: 68
Discharge: HOME | End: 2023-05-24
Attending: EMERGENCY MEDICINE

## 2023-05-24 VITALS
TEMPERATURE: 98.4 F | SYSTOLIC BLOOD PRESSURE: 153 MMHG | OXYGEN SATURATION: 97 % | DIASTOLIC BLOOD PRESSURE: 93 MMHG | RESPIRATION RATE: 19 BRPM | HEART RATE: 99 BPM

## 2023-05-24 DIAGNOSIS — K92.0 HEMATEMESIS WITH NAUSEA: Primary | ICD-10-CM

## 2023-05-24 DIAGNOSIS — K92.2 UPPER GI BLEED: ICD-10-CM

## 2023-05-24 LAB
ALBUMIN SERPL BCP-MCNC: 4.9 G/DL (ref 3.5–5)
ALP SERPL-CCNC: 66 U/L (ref 34–104)
ALT SERPL W P-5'-P-CCNC: 18 U/L (ref 7–52)
ANION GAP SERPL CALCULATED.3IONS-SCNC: 12 MMOL/L (ref 4–13)
AST SERPL W P-5'-P-CCNC: 17 U/L (ref 13–39)
BASOPHILS # BLD AUTO: 0.01 THOUSANDS/ÂΜL (ref 0–0.1)
BASOPHILS NFR BLD AUTO: 0 % (ref 0–1)
BILIRUB SERPL-MCNC: 1 MG/DL (ref 0.2–1)
BUN SERPL-MCNC: 29 MG/DL (ref 5–25)
CALCIUM SERPL-MCNC: 10.2 MG/DL (ref 8.4–10.2)
CHLORIDE SERPL-SCNC: 100 MMOL/L (ref 96–108)
CO2 SERPL-SCNC: 27 MMOL/L (ref 21–32)
CREAT SERPL-MCNC: 1.02 MG/DL (ref 0.6–1.3)
EOSINOPHIL # BLD AUTO: 0.01 THOUSAND/ÂΜL (ref 0–0.61)
EOSINOPHIL NFR BLD AUTO: 0 % (ref 0–6)
ERYTHROCYTE [DISTWIDTH] IN BLOOD BY AUTOMATED COUNT: 12.9 % (ref 11.6–15.1)
GFR SERPL CREATININE-BSD FRML MDRD: 75 ML/MIN/1.73SQ M
GLUCOSE SERPL-MCNC: 222 MG/DL (ref 65–140)
HCT VFR BLD AUTO: 47.9 % (ref 36.5–49.3)
HGB BLD-MCNC: 16.6 G/DL (ref 12–17)
IMM GRANULOCYTES # BLD AUTO: 0.02 THOUSAND/UL (ref 0–0.2)
IMM GRANULOCYTES NFR BLD AUTO: 0 % (ref 0–2)
LIPASE SERPL-CCNC: 12 U/L (ref 11–82)
LYMPHOCYTES # BLD AUTO: 0.77 THOUSANDS/ÂΜL (ref 0.6–4.47)
LYMPHOCYTES NFR BLD AUTO: 10 % (ref 14–44)
MCH RBC QN AUTO: 30.4 PG (ref 26.8–34.3)
MCHC RBC AUTO-ENTMCNC: 34.7 G/DL (ref 31.4–37.4)
MCV RBC AUTO: 88 FL (ref 82–98)
MONOCYTES # BLD AUTO: 0.53 THOUSAND/ÂΜL (ref 0.17–1.22)
MONOCYTES NFR BLD AUTO: 7 % (ref 4–12)
NEUTROPHILS # BLD AUTO: 6.05 THOUSANDS/ÂΜL (ref 1.85–7.62)
NEUTS SEG NFR BLD AUTO: 83 % (ref 43–75)
NRBC BLD AUTO-RTO: 0 /100 WBCS
PLATELET # BLD AUTO: 216 THOUSANDS/UL (ref 149–390)
PMV BLD AUTO: 10.6 FL (ref 8.9–12.7)
POTASSIUM SERPL-SCNC: 4 MMOL/L (ref 3.5–5.3)
PROT SERPL-MCNC: 7.8 G/DL (ref 6.4–8.4)
RBC # BLD AUTO: 5.46 MILLION/UL (ref 3.88–5.62)
SODIUM SERPL-SCNC: 139 MMOL/L (ref 135–147)
WBC # BLD AUTO: 7.39 THOUSAND/UL (ref 4.31–10.16)

## 2023-05-24 RX ORDER — FAMOTIDINE 10 MG/ML
40 INJECTION, SOLUTION INTRAVENOUS ONCE
Status: COMPLETED | OUTPATIENT
Start: 2023-05-24 | End: 2023-05-24

## 2023-05-24 RX ORDER — PANTOPRAZOLE SODIUM 40 MG/1
40 TABLET, DELAYED RELEASE ORAL DAILY
Qty: 21 TABLET | Refills: 0 | Status: SHIPPED | OUTPATIENT
Start: 2023-05-24 | End: 2023-06-14

## 2023-05-24 RX ORDER — ONDANSETRON 4 MG/1
4 TABLET, FILM COATED ORAL EVERY 6 HOURS
Qty: 12 TABLET | Refills: 0 | Status: SHIPPED | OUTPATIENT
Start: 2023-05-24

## 2023-05-24 RX ADMIN — SODIUM CHLORIDE 1000 ML: 0.9 INJECTION, SOLUTION INTRAVENOUS at 21:40

## 2023-05-24 RX ADMIN — FAMOTIDINE 40 MG: 10 INJECTION, SOLUTION INTRAVENOUS at 21:44

## 2023-05-25 NOTE — ED PROVIDER NOTES
History  Chief Complaint   Patient presents with   • Vomiting Blood     Coffee ground emesis since last night, mild lower abdominal discomfort per pt  Denies fevers or diarrhea  Pt with hernia, and yesterday was hurting  Taking Ibuprofen (only PRN) as needed  Has a hernia belt on it now  Then this AM vomited a little bit (no eating anything today)  Nausea all day and intermittent vomit  No prior hx, takes baby ASA daily  Right now: no pain, no nausea     Pmhx: HTN, cad with CABG, prostate CA, diabetes, high cholesterol   Knee replacement, trigger finger x 2, diverticulitis      History provided by:  Patient and spouse   used: No        Prior to Admission Medications   Prescriptions Last Dose Informant Patient Reported?  Taking?   amoxicillin (AMOXIL) 500 mg capsule   Yes No   Sig: amoxicillin 500 mg capsule   aspirin 81 mg chewable tablet   Yes No   Sig: Chew   atorvastatin (LIPITOR) 40 mg tablet   Yes No   Sig: Take 1 tablet by mouth daily   celecoxib (CeleBREX) 200 mg capsule   Yes No   Sig: Take 200 mg by mouth 2 (two) times a day   ibuprofen (MOTRIN) 800 mg tablet   No No   Sig: Take 1 tablet (800 mg total) by mouth 3 (three) times a day   losartan-hydrochlorothiazide (HYZAAR) 50-12 5 mg per tablet   Yes No   Sig: Take 1 tablet by mouth in the morning   metFORMIN (GLUCOPHAGE) 500 mg tablet   Yes No   Sig: Take 1 tablet by mouth 2 (two) times a day   methocarbamol (ROBAXIN) 500 mg tablet   No No   Sig: Take 1 tablet (500 mg total) by mouth 2 (two) times a day   metoprolol succinate (TOPROL-XL) 50 mg 24 hr tablet   Yes No   Sig: Take 1 tablet by mouth daily   oxyCODONE-acetaminophen (PERCOCET) 5-325 mg per tablet   Yes No   Sig: Take 1 tablet by mouth every 4 (four) hours as needed      Facility-Administered Medications: None       Past Medical History:   Diagnosis Date   • Cancer ([facility] )     prostate   • Diabetes ([facility] )    • Diabetes mellitus ([facility] )    • Diverticulitis    • HTN (hypertension)    • Hx of CABG    • Hypertension    • Inguinal hernia     R   • Prostate cancer [facility])        Past Surgical History:   Procedure Laterality Date   • COLONOSCOPY     • JOINT REPLACEMENT     • LAPAROSCOPIC COLON RESECTION         History reviewed  No pertinent family history  I have reviewed and agree with the history as documented  E-Cigarette/Vaping   • E-Cigarette Use Never User      E-Cigarette/Vaping Substances     Social History     Tobacco Use   • Smoking status: Never   • Smokeless tobacco: Never   Vaping Use   • Vaping Use: Never used   Substance Use Topics   • Alcohol use: Never   • Drug use: Never       Review of Systems   Constitutional: Negative for chills and fever  HENT: Negative for ear pain and sore throat  Eyes: Negative for pain and visual disturbance  Respiratory: Negative for cough and shortness of breath  Cardiovascular: Negative for chest pain and palpitations  Gastrointestinal: Positive for nausea and vomiting  Negative for abdominal pain  Genitourinary: Negative for dysuria and hematuria  Musculoskeletal: Negative for arthralgias and back pain  Skin: Negative for color change and rash  Neurological: Negative for seizures and syncope  All other systems reviewed and are negative  Physical Exam  Physical Exam  Vitals and nursing note reviewed  Constitutional:       General: He is not in acute distress  Appearance: He is well-developed  HENT:      Head: Normocephalic and atraumatic  Right Ear: External ear normal       Left Ear: External ear normal       Mouth/Throat:      Mouth: Mucous membranes are moist    Eyes:      Conjunctiva/sclera: Conjunctivae normal       Pupils: Pupils are equal, round, and reactive to light  Cardiovascular:      Rate and Rhythm: Regular rhythm  Tachycardia present  Pulses: Normal pulses  Heart sounds: Normal heart sounds  No murmur heard    Pulmonary:      Effort: Pulmonary effort is normal  No respiratory distress  Breath sounds: Normal breath sounds  Abdominal:      Palpations: Abdomen is soft  Tenderness: There is no abdominal tenderness  Musculoskeletal:         General: No swelling  Cervical back: Neck supple  Skin:     General: Skin is warm and dry  Capillary Refill: Capillary refill takes less than 2 seconds  Neurological:      General: No focal deficit present  Mental Status: He is alert and oriented to person, place, and time  Psychiatric:         Mood and Affect: Mood normal          Thought Content:  Thought content normal          Vital Signs  ED Triage Vitals [05/24/23 2055]   Temperature Pulse Respirations Blood Pressure SpO2   98 4 °F (36 9 °C) (!) 129 20 164/100 97 %      Temp Source Heart Rate Source Patient Position - Orthostatic VS BP Location FiO2 (%)   Oral Monitor Sitting Left arm --      Pain Score       5           Vitals:    05/24/23 2121 05/24/23 2130 05/24/23 2200 05/24/23 2230   BP:  144/99 150/89 153/93   Pulse: (!) 113 (!) 109 97 99   Patient Position - Orthostatic VS:  Lying  Lying         Visual Acuity      ED Medications  Medications   Famotidine (PF) (PEPCID) injection 40 mg (40 mg Intravenous Given 5/24/23 2144)   sodium chloride 0 9 % bolus 1,000 mL (0 mL Intravenous Stopped 5/24/23 2240)       Diagnostic Studies  Results Reviewed     Procedure Component Value Units Date/Time    CMP [[id number]]  (Abnormal) Collected: 05/24/23 2136    Lab Status: Final result Specimen: Blood from Arm, Left Updated: 05/24/23 2201     Sodium 139 mmol/L      Potassium 4 0 mmol/L      Chloride 100 mmol/L      CO2 27 mmol/L      ANION GAP 12 mmol/L      BUN 29 mg/dL      Creatinine 1 02 mg/dL      Glucose 222 mg/dL      Calcium 10 2 mg/dL      AST 17 U/L      ALT 18 U/L      Alkaline Phosphatase 66 U/L      Total Protein 7 8 g/dL      Albumin 4 9 g/dL      Total Bilirubin 1 00 mg/dL      eGFR 75 ml/min/1 73sq m     Narrative:      National Kidney Disease Foundation guidelines for Chronic Kidney Disease (CKD):   •  Stage 1 with normal or high GFR (GFR > 90 mL/min/1 73 square meters)  •  Stage 2 Mild CKD (GFR = 60-89 mL/min/1 73 square meters)  •  Stage 3A Moderate CKD (GFR = 45-59 mL/min/1 73 square meters)  •  Stage 3B Moderate CKD (GFR = 30-44 mL/min/1 73 square meters)  •  Stage 4 Severe CKD (GFR = 15-29 mL/min/1 73 square meters)  •  Stage 5 End Stage CKD (GFR <15 mL/min/1 73 square meters)  Note: GFR calculation is accurate only with a steady state creatinine    Lipase [[id number]]  (Normal) Collected: 05/24/23 2136    Lab Status: Final result Specimen: Blood from Arm, Left Updated: 05/24/23 2201     Lipase 12 u/L     CBC and differential [[id number]]  (Abnormal) Collected: 05/24/23 2136    Lab Status: Final result Specimen: Blood from Arm, Left Updated: 05/24/23 2144     WBC 7 39 Thousand/uL      RBC 5 46 Million/uL      Hemoglobin 16 6 g/dL      Hematocrit 47 9 %      MCV 88 fL      MCH 30 4 pg      MCHC 34 7 g/dL      RDW 12 9 %      MPV 10 6 fL      Platelets 686 Thousands/uL      nRBC 0 /100 WBCs      Neutrophils Relative 83 %      Immat GRANS % 0 %      Lymphocytes Relative 10 %      Monocytes Relative 7 %      Eosinophils Relative 0 %      Basophils Relative 0 %      Neutrophils Absolute 6 05 Thousands/µL      Immature Grans Absolute 0 02 Thousand/uL      Lymphocytes Absolute 0 77 Thousands/µL      Monocytes Absolute 0 53 Thousand/µL      Eosinophils Absolute 0 01 Thousand/µL      Basophils Absolute 0 01 Thousands/µL                  No orders to display              Procedures  Procedures         ED Course  ED Course as of 05/24/23 2349   Wed May 24, 2023   2226 WBC: 7 39   2226 Hemoglobin: 16 6   2226 HCT: 47 9                               SBIRT 22yo+    Flowsheet Row Most Recent Value   Initial Alcohol Screen: US AUDIT-C     1  How often do you have a drink containing alcohol? 1 Filed at: 05/24/2023 2119   2   How many drinks containing alcohol do you have on a typical day you are drinking? 1 Filed at: 05/24/2023 2119   3a  Male UNDER 65: How often do you have five or more drinks on one occasion? 0 Filed at: 05/24/2023 2119   Audit-C Score 2 Filed at: 05/24/2023 2119   SHALINI: How many times in the past year have you    Used an illegal drug or used a prescription medication for non-medical reasons? Never Filed at: 05/24/2023 2119                    Medical Decision Making  Laboratory results revealed a   WBC   Normal  HH   Normal  PLT   Normal  Kidney Function  Normal  Electrolytes  Normal       Hematemesis with nausea: acute illness or injury  Upper GI bleed: self-limited or minor problem     Details: No signs and symptoms of active bleeding  Abdomen completely benign to examination  Normal H&H normal vital signs  Mild tachycardia has resolved  Amount and/or Complexity of Data Reviewed  Labs: ordered  Decision-making details documented in ED Course  Discussion of management or test interpretation with external provider(s): R inguinal hernia reducible  Non tender  Not incarcerated       Risk  OTC drugs  Prescription drug management  Disposition  Final diagnoses:   Hematemesis with nausea   Upper GI bleed     Time reflects when diagnosis was documented in both MDM as applicable and the Disposition within this note     Time User Action Codes Description Comment    5/24/2023 10:27 PM Abdi Styles [K92 0] Hematemesis with nausea     5/24/2023 10:27 PM Abdi Styles [K92 2] Upper GI bleed       ED Disposition     ED Disposition   Discharge    Condition   Stable    Date/Time   Wed May 24, 2023 10:27 PM    Comment   Germán Prime discharge to home/self care                 Follow-up Information     Follow up With Specialties Details Why Contact Info Additional Abeba Mendosa [facility] [address] [facility] In 3 days  [address] [phone]  Serena Aguillon [address] [facility], [address], [address], [facility], [place], [address]           Discharge Medication List as of 5/24/2023 10:30 PM      START taking these medications    Details   ondansetron (ZOFRAN) 4 mg tablet Take 1 tablet (4 mg total) by mouth every 6 (six) hours, Starting Wed 5/24/2023, Normal      pantoprazole (PROTONIX) 40 mg tablet Take 1 tablet (40 mg total) by mouth daily for 21 days, Starting Wed 5/24/2023, Until Wed 6/14/2023, Normal         CONTINUE these medications which have NOT CHANGED    Details   amoxicillin (AMOXIL) 500 mg capsule amoxicillin 500 mg capsule, Historical Med      aspirin 81 mg chewable tablet Chew, Historical Med      atorvastatin (LIPITOR) 40 mg tablet Take 1 tablet by mouth daily, Starting Wed 6/29/2022, Historical Med      celecoxib (CeleBREX) 200 mg capsule Take 200 mg by mouth 2 (two) times a day, Starting Thu 9/22/2022, Historical Med      ibuprofen (MOTRIN) 800 mg tablet Take 1 tablet (800 mg total) by mouth 3 (three) times a day, Starting Wed 4/12/2023, Normal      losartan-hydrochlorothiazide (HYZAAR) 50-12 5 mg per tablet Take 1 tablet by mouth in the morning, Historical Med      metFORMIN (GLUCOPHAGE) 500 mg tablet Take 1 tablet by mouth 2 (two) times a day, Starting Thu 6/9/2022, Historical Med      methocarbamol (ROBAXIN) 500 mg tablet Take 1 tablet (500 mg total) by mouth 2 (two) times a day, Starting Wed 4/12/2023, Normal      metoprolol succinate (TOPROL-XL) 50 mg 24 hr tablet Take 1 tablet by mouth daily, Starting Sat 6/11/2022, Historical Med      oxyCODONE-acetaminophen (PERCOCET) 5-325 mg per tablet Take 1 tablet by mouth every 4 (four) hours as needed, Starting Thu 9/22/2022, Historical Med             No discharge procedures on file      PDMP Review     None          ED Provider  Electronically Signed by           Patricia Jack MD  05/24/23 2499

## 2023-05-25 NOTE — DISCHARGE INSTRUCTIONS
A  personal message from Dr Tata Maddox,  Thank you so much for allowing me to care for you today  I pride myself in the care and attention I give all my patients  I hope you were a witness to this tonight  If for any reason your condition does not improve or worsens, or you have a question that was not answered during your visit you can feel free to text me on my personal phone #  # [phone]  I will answer to your message and continue your care past your emergency room visit  Please understand that although you are being discharged because your condition has been deemed stable and able to be managed on an outpatient setting  However your condition may worsen as part of the natural progression of the illness/condition, if this occurs please come back to the emergency department for a repeat evaluation

## 2023-06-05 ENCOUNTER — OFFICE VISIT (OUTPATIENT)
Dept: GASTROENTEROLOGY | Facility: CLINIC | Age: 68
End: 2023-06-05
Payer: COMMERCIAL

## 2023-06-05 VITALS
DIASTOLIC BLOOD PRESSURE: 80 MMHG | BODY MASS INDEX: 30.7 KG/M2 | HEIGHT: 66 IN | OXYGEN SATURATION: 98 % | HEART RATE: 90 BPM | SYSTOLIC BLOOD PRESSURE: 152 MMHG | WEIGHT: 191 LBS

## 2023-06-05 DIAGNOSIS — K92.2 UPPER GI BLEED: ICD-10-CM

## 2023-06-05 PROCEDURE — 99204 OFFICE O/P NEW MOD 45 MIN: CPT | Performed by: PHYSICIAN ASSISTANT

## 2023-06-05 RX ORDER — PANTOPRAZOLE SODIUM 40 MG/1
40 TABLET, DELAYED RELEASE ORAL DAILY
Qty: 30 TABLET | Refills: 2 | Status: SHIPPED | OUTPATIENT
Start: 2023-06-05 | End: 2023-07-05

## 2023-06-05 RX ORDER — OLMESARTAN MEDOXOMIL AND HYDROCHLOROTHIAZIDE 20/12.5 20; 12.5 MG/1; MG/1
1 TABLET ORAL DAILY
COMMUNITY
Start: 2023-03-16

## 2023-06-05 NOTE — PROGRESS NOTES
[facility] - Outpatient Consultation  Georgie Garcia 76 y o  male MRN: [id number]  Encounter: [id number]          ASSESSMENT AND PLAN:      1  Coffee ground emesis    Patient previously had 2 episodes of coffee ground emesis on 5/24 with associated abdominal discomfort  He went to the ER  HGB was normal at 16 6  He was started on Pantoprazole 40mg po daily and is feeling better  No further obvious gastrointestinal bleeding  Will plan for EGD to investigate for PUD, erosive gastritis, etc   Continue Pantoprazole 40mg po daily  Avoidance of NSAIDs   ______________________________________________________________________    HPI:  Patient is a pleasant 76year old male with a PMH of DM, prostate cancer, CAD s/p CABG, HTN who presents to the office for an evaluation of coffee ground emesis  Patient reports he had 2 episodes of coffee ground emesis associated with abdominal discomfort on 5/24  He also noted dark stool  He went to the ER  HGB was normal at 16 6  He was discharge home on Pantoprazole 40mg po daily  No further hematemesis  He feels better  His stool is brown  No rectal bleeding  He reports intermittent NSAID use  No smoking or alcohol  No prior EGD  No family history of esophageal or stomach cancer  He reports a family history of colon cancer and is scheduling his colonoscopy with Dr Alanna Conley as he is due for screening  REVIEW OF SYSTEMS:    CONSTITUTIONAL: Denies any fever, chills, rigors, and weight loss  HEENT: No earache or tinnitus  Denies hearing loss or visual disturbances  CARDIOVASCULAR: No chest pain or palpitations  RESPIRATORY: Denies any cough, hemoptysis, shortness of breath or dyspnea on exertion  GASTROINTESTINAL: As noted in the History of Present Illness  GENITOURINARY: No problems with urination  Denies any hematuria or dysuria  NEUROLOGIC: No dizziness or vertigo, denies headaches     MUSCULOSKELETAL: Denies any muscle or "joint pain  SKIN: Denies skin rashes or itching  ENDOCRINE: Denies excessive thirst  Denies intolerance to heat or cold  PSYCHOSOCIAL: Denies depression or anxiety  Denies any recent memory loss  Historical Information   Past Medical History:   Diagnosis Date   • Cancer ([facility] )     prostate   • Diabetes ([facility] )    • Diabetes mellitus ([place] [address] )    • Diverticulitis    • HTN (hypertension)    • Hx of CABG    • Hypertension    • Inguinal hernia     R   • Prostate cancer ([place] [address] )      Past Surgical History:   Procedure Laterality Date   • COLONOSCOPY     • JOINT REPLACEMENT     • LAPAROSCOPIC COLON RESECTION       Social History   Social History     Substance and Sexual Activity   Alcohol Use Never     Social History     Substance and Sexual Activity   Drug Use Never     Social History     Tobacco Use   Smoking Status Never   Smokeless Tobacco Never     History reviewed  No pertinent family history  Meds/Allergies       Current Outpatient Medications:   •  aspirin 81 mg chewable tablet  •  atorvastatin (LIPITOR) 40 mg tablet  •  celecoxib (CeleBREX) 200 mg capsule  •  metFORMIN (GLUCOPHAGE) 1000 MG tablet  •  methocarbamol (ROBAXIN) 500 mg tablet  •  metoprolol succinate (TOPROL-XL) 50 mg 24 hr tablet  •  olmesartan-hydrochlorothiazide (BENICAR HCT) 20-12 5 MG per tablet  •  ondansetron (ZOFRAN) 4 mg tablet  •  pantoprazole (PROTONIX) 40 mg tablet  •  oxyCODONE-acetaminophen (PERCOCET) 5-325 mg per tablet    No Known Allergies        Objective     Blood pressure 152/80, pulse 90, height 5' 6\" (1 676 m), weight 86 6 kg (191 lb), SpO2 98 %  Body mass index is 30 83 kg/m²  PHYSICAL EXAM:      General Appearance:   Alert, cooperative, no distress   HEENT:   Normocephalic, atraumatic, anicteric      Neck:  Supple, symmetrical, trachea midline   Lungs:   Clear to auscultation bilaterally; no rales, rhonchi or wheezing; respirations unlabored    Heart[de-identified]   Regular rate and rhythm; no murmur, rub, or gallop     Abdomen: "   Soft, non-tender, non-distended; normal bowel sounds; no masses, no organomegaly    Genitalia:   Deferred    Rectal:   Deferred    Extremities:  No cyanosis, clubbing or edema    Pulses:  2+ and symmetric    Skin:  No jaundice, rashes, or lesions    Lymph nodes:  No palpable cervical lymphadenopathy        Lab Results:   No visits with results within 1 Day(s) from this visit  Latest known visit with results is:   Admission on 05/24/2023, Discharged on 05/24/2023   Component Date Value   • WBC 05/24/2023 7 39    • RBC 05/24/2023 5 46    • Hemoglobin 05/24/2023 16 6    • Hematocrit 05/24/2023 47 9    • MCV 05/24/2023 88    • MCH 05/24/2023 30 4    • MCHC 05/24/2023 34 7    • RDW 05/24/2023 12 9    • MPV 05/24/2023 10 6    • Platelets 64/23/4237 216    • nRBC 05/24/2023 0    • Neutrophils Relative 05/24/2023 83 (H)    • Immat GRANS % 05/24/2023 0    • Lymphocytes Relative 05/24/2023 10 (L)    • Monocytes Relative 05/24/2023 7    • Eosinophils Relative 05/24/2023 0    • Basophils Relative 05/24/2023 0    • Neutrophils Absolute 05/24/2023 6 05    • Immature Grans Absolute 05/24/2023 0 02    • Lymphocytes Absolute 05/24/2023 0 77    • Monocytes Absolute 05/24/2023 0 53    • Eosinophils Absolute 05/24/2023 0 01    • Basophils Absolute 05/24/2023 0 01    • Sodium 05/24/2023 139    • Potassium 05/24/2023 4 0    • Chloride 05/24/2023 100    • CO2 05/24/2023 27    • ANION GAP 05/24/2023 12    • BUN 05/24/2023 29 (H)    • Creatinine 05/24/2023 1 02    • Glucose 05/24/2023 222 (H)    • Calcium 05/24/2023 10 2    • AST 05/24/2023 17    • ALT 05/24/2023 18    • Alkaline Phosphatase 05/24/2023 66    • Total Protein 05/24/2023 7 8    • Albumin 05/24/2023 4 9    • Total Bilirubin 05/24/2023 1 00    • eGFR 05/24/2023 75    • Lipase 05/24/2023 12          Radiology Results:   No results found

## 2023-06-05 NOTE — PATIENT INSTRUCTIONS
Scheduled date of EGD(as of today): 6/14/23  Physician performing EGD: Sosa Rivero  Location of EGD: [facility]  Instructions reviewed with patient by: Jam COSTA  Clearances:

## 2023-06-05 NOTE — H&P (VIEW-ONLY)
[facility] - Outpatient Consultation  Winsome Willingham 76 y o  male MRN: [id number]  Encounter: [id number]          ASSESSMENT AND PLAN:      1  Coffee ground emesis    Patient previously had 2 episodes of coffee ground emesis on 5/24 with associated abdominal discomfort  He went to the ER  HGB was normal at 16 6  He was started on Pantoprazole 40mg po daily and is feeling better  No further obvious gastrointestinal bleeding  Will plan for EGD to investigate for PUD, erosive gastritis, etc   Continue Pantoprazole 40mg po daily  Avoidance of NSAIDs   ______________________________________________________________________    HPI:  Patient is a pleasant 76year old male with a PMH of DM, prostate cancer, CAD s/p CABG, HTN who presents to the office for an evaluation of coffee ground emesis  Patient reports he had 2 episodes of coffee ground emesis associated with abdominal discomfort on 5/24  He also noted dark stool  He went to the ER  HGB was normal at 16 6  He was discharge home on Pantoprazole 40mg po daily  No further hematemesis  He feels better  His stool is brown  No rectal bleeding  He reports intermittent NSAID use  No smoking or alcohol  No prior EGD  No family history of esophageal or stomach cancer  He reports a family history of colon cancer and is scheduling his colonoscopy with Dr Tramaine Vega as he is due for screening  REVIEW OF SYSTEMS:    CONSTITUTIONAL: Denies any fever, chills, rigors, and weight loss  HEENT: No earache or tinnitus  Denies hearing loss or visual disturbances  CARDIOVASCULAR: No chest pain or palpitations  RESPIRATORY: Denies any cough, hemoptysis, shortness of breath or dyspnea on exertion  GASTROINTESTINAL: As noted in the History of Present Illness  GENITOURINARY: No problems with urination  Denies any hematuria or dysuria  NEUROLOGIC: No dizziness or vertigo, denies headaches     MUSCULOSKELETAL: Denies any muscle or "joint pain  SKIN: Denies skin rashes or itching  ENDOCRINE: Denies excessive thirst  Denies intolerance to heat or cold  PSYCHOSOCIAL: Denies depression or anxiety  Denies any recent memory loss  Historical Information   Past Medical History:   Diagnosis Date   • Cancer ([facility] )     prostate   • Diabetes ([facility] )    • Diabetes mellitus ([place] [address] )    • Diverticulitis    • HTN (hypertension)    • Hx of CABG    • Hypertension    • Inguinal hernia     R   • Prostate cancer ([place] [address] )      Past Surgical History:   Procedure Laterality Date   • COLONOSCOPY     • JOINT REPLACEMENT     • LAPAROSCOPIC COLON RESECTION       Social History   Social History     Substance and Sexual Activity   Alcohol Use Never     Social History     Substance and Sexual Activity   Drug Use Never     Social History     Tobacco Use   Smoking Status Never   Smokeless Tobacco Never     History reviewed  No pertinent family history  Meds/Allergies       Current Outpatient Medications:   •  aspirin 81 mg chewable tablet  •  atorvastatin (LIPITOR) 40 mg tablet  •  celecoxib (CeleBREX) 200 mg capsule  •  metFORMIN (GLUCOPHAGE) 1000 MG tablet  •  methocarbamol (ROBAXIN) 500 mg tablet  •  metoprolol succinate (TOPROL-XL) 50 mg 24 hr tablet  •  olmesartan-hydrochlorothiazide (BENICAR HCT) 20-12 5 MG per tablet  •  ondansetron (ZOFRAN) 4 mg tablet  •  pantoprazole (PROTONIX) 40 mg tablet  •  oxyCODONE-acetaminophen (PERCOCET) 5-325 mg per tablet    No Known Allergies        Objective     Blood pressure 152/80, pulse 90, height 5' 6\" (1 676 m), weight 86 6 kg (191 lb), SpO2 98 %  Body mass index is 30 83 kg/m²  PHYSICAL EXAM:      General Appearance:   Alert, cooperative, no distress   HEENT:   Normocephalic, atraumatic, anicteric      Neck:  Supple, symmetrical, trachea midline   Lungs:   Clear to auscultation bilaterally; no rales, rhonchi or wheezing; respirations unlabored    Heart[de-identified]   Regular rate and rhythm; no murmur, rub, or gallop     Abdomen: "   Soft, non-tender, non-distended; normal bowel sounds; no masses, no organomegaly    Genitalia:   Deferred    Rectal:   Deferred    Extremities:  No cyanosis, clubbing or edema    Pulses:  2+ and symmetric    Skin:  No jaundice, rashes, or lesions    Lymph nodes:  No palpable cervical lymphadenopathy        Lab Results:   No visits with results within 1 Day(s) from this visit  Latest known visit with results is:   Admission on 05/24/2023, Discharged on 05/24/2023   Component Date Value   • WBC 05/24/2023 7 39    • RBC 05/24/2023 5 46    • Hemoglobin 05/24/2023 16 6    • Hematocrit 05/24/2023 47 9    • MCV 05/24/2023 88    • MCH 05/24/2023 30 4    • MCHC 05/24/2023 34 7    • RDW 05/24/2023 12 9    • MPV 05/24/2023 10 6    • Platelets 82/08/5129 216    • nRBC 05/24/2023 0    • Neutrophils Relative 05/24/2023 83 (H)    • Immat GRANS % 05/24/2023 0    • Lymphocytes Relative 05/24/2023 10 (L)    • Monocytes Relative 05/24/2023 7    • Eosinophils Relative 05/24/2023 0    • Basophils Relative 05/24/2023 0    • Neutrophils Absolute 05/24/2023 6 05    • Immature Grans Absolute 05/24/2023 0 02    • Lymphocytes Absolute 05/24/2023 0 77    • Monocytes Absolute 05/24/2023 0 53    • Eosinophils Absolute 05/24/2023 0 01    • Basophils Absolute 05/24/2023 0 01    • Sodium 05/24/2023 139    • Potassium 05/24/2023 4 0    • Chloride 05/24/2023 100    • CO2 05/24/2023 27    • ANION GAP 05/24/2023 12    • BUN 05/24/2023 29 (H)    • Creatinine 05/24/2023 1 02    • Glucose 05/24/2023 222 (H)    • Calcium 05/24/2023 10 2    • AST 05/24/2023 17    • ALT 05/24/2023 18    • Alkaline Phosphatase 05/24/2023 66    • Total Protein 05/24/2023 7 8    • Albumin 05/24/2023 4 9    • Total Bilirubin 05/24/2023 1 00    • eGFR 05/24/2023 75    • Lipase 05/24/2023 12          Radiology Results:   No results found

## 2023-06-14 ENCOUNTER — ANESTHESIA (OUTPATIENT)
Dept: GASTROENTEROLOGY | Facility: HOSPITAL | Age: 68
End: 2023-06-14

## 2023-06-14 ENCOUNTER — ANESTHESIA EVENT (OUTPATIENT)
Dept: GASTROENTEROLOGY | Facility: HOSPITAL | Age: 68
End: 2023-06-14

## 2023-06-14 ENCOUNTER — HOSPITAL ENCOUNTER (OUTPATIENT)
Dept: GASTROENTEROLOGY | Facility: HOSPITAL | Age: 68
Setting detail: OUTPATIENT SURGERY
Discharge: HOME | End: 2023-06-14
Payer: COMMERCIAL

## 2023-06-14 VITALS
DIASTOLIC BLOOD PRESSURE: 83 MMHG | HEIGHT: 66 IN | HEART RATE: 82 BPM | RESPIRATION RATE: 18 BRPM | BODY MASS INDEX: 30.65 KG/M2 | OXYGEN SATURATION: 97 % | TEMPERATURE: 97.7 F | SYSTOLIC BLOOD PRESSURE: 129 MMHG | WEIGHT: 190.7 LBS

## 2023-06-14 DIAGNOSIS — K92.2 UPPER GI BLEED: ICD-10-CM

## 2023-06-14 PROBLEM — E78.5 DYSLIPIDEMIA, GOAL LDL BELOW 70: Status: ACTIVE | Noted: 2023-06-14

## 2023-06-14 PROBLEM — I25.10 CAD (CORONARY ARTERY DISEASE): Status: ACTIVE | Noted: 2023-06-14

## 2023-06-14 PROBLEM — E11.9 DM TYPE 2, GOAL HBA1C < 7% (HCC): Status: ACTIVE | Noted: 2023-06-14

## 2023-06-14 PROBLEM — Z95.1 HX OF CABG: Status: ACTIVE | Noted: 2023-06-14

## 2023-06-14 LAB — GLUCOSE SERPL-MCNC: 160 MG/DL (ref 65–140)

## 2023-06-14 PROCEDURE — 88305 TISSUE EXAM BY PATHOLOGIST: CPT | Performed by: STUDENT IN AN ORGANIZED HEALTH CARE EDUCATION/TRAINING PROGRAM

## 2023-06-14 PROCEDURE — 82948 REAGENT STRIP/BLOOD GLUCOSE: CPT

## 2023-06-14 PROCEDURE — 43239 EGD BIOPSY SINGLE/MULTIPLE: CPT | Performed by: INTERNAL MEDICINE

## 2023-06-14 PROCEDURE — 88342 IMHCHEM/IMCYTCHM 1ST ANTB: CPT | Performed by: STUDENT IN AN ORGANIZED HEALTH CARE EDUCATION/TRAINING PROGRAM

## 2023-06-14 RX ORDER — PROPOFOL 10 MG/ML
INJECTION, EMULSION INTRAVENOUS AS NEEDED
Status: DISCONTINUED | OUTPATIENT
Start: 2023-06-14 | End: 2023-06-14

## 2023-06-14 RX ORDER — SODIUM CHLORIDE, SODIUM LACTATE, POTASSIUM CHLORIDE, CALCIUM CHLORIDE 600; 310; 30; 20 MG/100ML; MG/100ML; MG/100ML; MG/100ML
INJECTION, SOLUTION INTRAVENOUS CONTINUOUS PRN
Status: DISCONTINUED | OUTPATIENT
Start: 2023-06-14 | End: 2023-06-14

## 2023-06-14 RX ORDER — LIDOCAINE HYDROCHLORIDE 20 MG/ML
INJECTION, SOLUTION EPIDURAL; INFILTRATION; INTRACAUDAL; PERINEURAL AS NEEDED
Status: DISCONTINUED | OUTPATIENT
Start: 2023-06-14 | End: 2023-06-14

## 2023-06-14 RX ADMIN — PROPOFOL 150 MG: 10 INJECTION, EMULSION INTRAVENOUS at 07:40

## 2023-06-14 RX ADMIN — PROPOFOL 20 MG: 10 INJECTION, EMULSION INTRAVENOUS at 07:42

## 2023-06-14 RX ADMIN — SODIUM CHLORIDE, SODIUM LACTATE, POTASSIUM CHLORIDE, AND CALCIUM CHLORIDE: .6; .31; .03; .02 INJECTION, SOLUTION INTRAVENOUS at 07:35

## 2023-06-14 RX ADMIN — PROPOFOL 50 MG: 10 INJECTION, EMULSION INTRAVENOUS at 07:41

## 2023-06-14 RX ADMIN — LIDOCAINE HYDROCHLORIDE 100 MG: 20 INJECTION, SOLUTION EPIDURAL; INFILTRATION; INTRACAUDAL at 07:39

## 2023-06-14 NOTE — ANESTHESIA PREPROCEDURE EVALUATION
Procedure:  EGD    Relevant Problems   ANESTHESIA (within normal limits)   (-) History of anesthesia complications      CARDIO   (+) CAD (coronary artery disease)   (+) Dyslipidemia, goal LDL below 70   (+) Hypertension   (-) Chest pain   (-) RENEE (dyspnea on exertion)      ENDO   (+) DM type 2, goal HbA1c < 7% (HCC)      PULMONARY   (-) Shortness of breath   (-) URI (upper respiratory infection)      Other   (+) Hx of CABG (18yrs ago)      CONCLUSIONS:   Normal sinus rhythm   Right bundle branch block   Abnormal ECG   When compared with ECG of 28-MAY-2020 07:22,   No significant change was found     Physical Exam    Airway    Mallampati score: II  TM Distance: >3 FB  Neck ROM: full     Dental       Cardiovascular      Pulmonary      Other Findings        Anesthesia Plan  ASA Score- 3     Anesthesia Type- IV sedation with anesthesia with ASA Monitors  Additional Monitors:   Airway Plan:           Plan Factors-Exercise tolerance (METS): >4 METS  Chart reviewed  EKG reviewed  Existing labs reviewed  Patient summary reviewed  Induction- intravenous  Postoperative Plan-     Informed Consent- Anesthetic plan and risks discussed with patient  I personally reviewed this patient with the CRNA  Discussed and agreed on the Anesthesia Plan with the ANETTE Wagoner

## 2023-06-14 NOTE — ANESTHESIA POSTPROCEDURE EVALUATION
Post-Op Assessment Note    CV Status:  Stable  Pain Score: 0    Pain management: adequate     Mental Status:  Awake   Hydration Status:  Stable   PONV Controlled:  Controlled   Airway Patency:  Patent      Post Op Vitals Reviewed: Yes      Staff: CRNA         There were no known notable events for this encounter      BP  170/111   Temp     Pulse 87   Resp   14   SpO2   98

## 2023-06-14 NOTE — INTERVAL H&P NOTE
H&P reviewed  After examining the patient I find no changes in the patients condition since the H&P had been written      Vitals:    06/14/23 0659   BP: (!) 173/83   Pulse: 86   Resp: 17   Temp: (!) 97 3 °F (36 3 °C)   SpO2: 98%

## 2023-06-19 PROCEDURE — 88305 TISSUE EXAM BY PATHOLOGIST: CPT | Performed by: STUDENT IN AN ORGANIZED HEALTH CARE EDUCATION/TRAINING PROGRAM

## 2023-06-19 PROCEDURE — 88342 IMHCHEM/IMCYTCHM 1ST ANTB: CPT | Performed by: STUDENT IN AN ORGANIZED HEALTH CARE EDUCATION/TRAINING PROGRAM

## 2023-06-20 NOTE — ED ADULT NURSE NOTE - SUICIDE SCREENING QUESTION 3
In an effort to ensure that our patients [employer]Well, a Team Member has reviewed your chart and identified an opportunity to provide the best care possible. An attempt was made to discuss or schedule overdue Preventive or Disease Management screening.     The Outcome was Contact was made, appointment scheduled. Care Gaps include Wellness Visits.    Spoke to pt's wife (POA). She stated pt has dementia and is immobile. Pt can only do virtual appt.  
No

## 2023-07-05 DIAGNOSIS — K92.2 UPPER GI BLEED: ICD-10-CM

## 2023-07-05 RX ORDER — PANTOPRAZOLE SODIUM 40 MG/1
TABLET, DELAYED RELEASE ORAL
Qty: 90 TABLET | Refills: 1 | Status: SHIPPED | OUTPATIENT
Start: 2023-07-05

## 2023-08-01 ENCOUNTER — OFFICE VISIT (OUTPATIENT)
Age: 68
End: 2023-08-01
Payer: COMMERCIAL

## 2023-08-01 VITALS
DIASTOLIC BLOOD PRESSURE: 80 MMHG | WEIGHT: 186 LBS | SYSTOLIC BLOOD PRESSURE: 132 MMHG | OXYGEN SATURATION: 98 % | BODY MASS INDEX: 29.89 KG/M2 | HEART RATE: 73 BPM | HEIGHT: 66 IN

## 2023-08-01 DIAGNOSIS — K92.0 HEMATEMESIS WITHOUT NAUSEA: Primary | ICD-10-CM

## 2023-08-01 PROCEDURE — 99214 OFFICE O/P EST MOD 30 MIN: CPT | Performed by: INTERNAL MEDICINE

## 2023-08-01 NOTE — PROGRESS NOTES
[facility]      Chief Complaint: Throwing up blood    HPI:  Nara Rivers is a 76 y.o.  male who presents with a single episode of recurrent hematemesis last Wednesday. Patient has not been on the Protonix. EGD done in June for upper GI bleed was essentially unremarkable with negative biopsies. CBC on May 24 showed normal hemoglobin and hematocrit. Patient reports 1 single episode of coffee-ground emesis with no significant nausea. No melena. No further episodes. No abdominal or chest pain. No definitive antecedent cause. No nonsteroidals. No blood thinners. He did not have any retching beforehand. This was just somewhat spontaneous. No other recent symptomatology. He did not go to the ER or get any blood testing done. .      Review of Systems:   Constitutional: No fever or chills, feels well, no tiredness, no recent weight gain or weight loss. HENT: No complaints of earache, no hearing loss, no nosebleeds, no nasal discharge, no sore throat, no hoarseness. Eyes: No complaints of eye pain, no red eyes, no discharge from eyes, no itchy eyes. Cardiovascular: No complaints of slow heart rate, no fast heart rate, no chest pain, no palpitations, no leg claudication, no lower extremity edema. Respiratory: No complaints of shortness of breath, no wheezing, no cough, no SOB on exertion, no orthopnea. Gastrointestinal: As noted in HPI  Genitourinary: No complaints of dysuria, no incontinence, no hesitancy, no nocturia. Musculoskeletal: No complaints of arthralgia, no myalgias, no joint swelling or stiffness, no limb pain or swelling. Neurological: No complaints of headache, no confusion, no convulsions, no numbness or tingling, no dizziness or fainting, no limb weakness, no difficulty walking. Skin: No complaints of skin rash or skin lesions, no itching, no skin wound, no dry skin. Hematological/Lymphatic: No complaints of swollen glands, does not bleed easy. Allergic/Immunologic: No immunocompromised state. Endocrine:  No complaints of polyuria, no polydipsia. Psychiatric/Behavioral: is not suicidal, no sleep disturbances, no anxiety or depression, no change in personality, no emotional problems. Historical Information   Past Medical History:   Diagnosis Date   • Cancer ([address])     prostate   • Diabetes ([address])    • Diabetes mellitus ([address])    • Diverticulitis    • HTN (hypertension)    • Hx of CABG    • Hypertension    • Inguinal hernia     R   • Prostate cancer ([address])      Past Surgical History:   Procedure Laterality Date   • COLONOSCOPY     • JOINT REPLACEMENT     • LAPAROSCOPIC COLON RESECTION       Social History   Social History     Substance and Sexual Activity   Alcohol Use Never     Social History     Substance and Sexual Activity   Drug Use Never     Social History     Tobacco Use   Smoking Status Never   Smokeless Tobacco Never     History reviewed. No pertinent family history. Current Medications: has a current medication list which includes the following prescription(s): aspirin, atorvastatin, celecoxib, metformin, methocarbamol, metoprolol succinate, olmesartan-hydrochlorothiazide, ondansetron, and pantoprazole. Vital Signs: /80   Pulse 73   Ht 5' 6" (1.676 m)   Wt 84.4 kg (186 lb)   SpO2 98%   BMI 30.02 kg/m²       Physical Exam:   Constitutional  General Appearance: No acute distress, well appearing and well nourished  Head  Normocephalic  Eyes  Conjunctivae and lids: No swelling, erythema, or discharge. Pupils and irises: Equal, round and reactive to light. Ears, Nose, Mouth, and Throat  External inspection of ears and nose: Normal  Nasal mucosa, septum and turbinates: Normal without edema or erythema/   Oropharynx: Normal with no erythema, edema, exudate or lesions. Neck  Normal range of motion. Neck supple.    Cardiovascular  Auscultation of the heart: Normal rate and rhythm, normal S1 and S2 without murmurs. Examination of the extremities for edema and/or varicosities: Normal  Pulmonary/Chest  Respiratory effort: No increased work of breathing or signs of respiratory distress. Auscultation of lungs: Clear to auscultation, equal breath sounds bilaterally, no wheezes, rales, no rhonchi. Abdomen  Abdomen: Non-tender, no masses. Liver and spleen: No hepatomegaly or splenomegaly. Musculoskeletal  Gait and station: normal.  Digits and Nails: normal without clubbing or cyanosis. Inspection/palpation of joints, bones, and muscles: Normal  Neurological  No nystagmus or asterixis. Skin  Skin and subcutaneous tissue: Normal without rashes or lesions. Lymphatic  Palpation of the lymph nodes in neck: No lymphadenopathy. Psychiatric  Orientation to person, place and time: Normal.  Mood and affect: Normal.         Labs:  Lab Results   Component Value Date    ALT 18 05/24/2023    AST 17 05/24/2023    BUN 29 (H) 05/24/2023    CALCIUM 10.2 05/24/2023     05/24/2023    CO2 27 05/24/2023    CREATININE 1.02 05/24/2023    HCT 47.9 05/24/2023    HGB 16.6 05/24/2023    HGBA1C 8.6 (H) 11/14/2022     05/24/2023    K 4.0 05/24/2023    WBC 7.39 05/24/2023         X-Rays & Procedures:   No orders to display           ______________________________________________________________________      Assessment & Plan:     Diagnoses and all orders for this visit:    Hematemesis without nausea  -     EGD; Future  -     CBC; Future      Patient will undergo repeat EGD and CBC. He is back on Protonix and will stay on it.   Further recommendations will depend on study results

## 2023-08-01 NOTE — H&P (VIEW-ONLY)
[facility]      Chief Complaint: Throwing up blood    HPI:  Micah Blakely is a 76 y.o.  male who presents with a single episode of recurrent hematemesis last Wednesday. Patient has not been on the Protonix. EGD done in June for upper GI bleed was essentially unremarkable with negative biopsies. CBC on May 24 showed normal hemoglobin and hematocrit. Patient reports 1 single episode of coffee-ground emesis with no significant nausea. No melena. No further episodes. No abdominal or chest pain. No definitive antecedent cause. No nonsteroidals. No blood thinners. He did not have any retching beforehand. This was just somewhat spontaneous. No other recent symptomatology. He did not go to the ER or get any blood testing done. .      Review of Systems:   Constitutional: No fever or chills, feels well, no tiredness, no recent weight gain or weight loss. HENT: No complaints of earache, no hearing loss, no nosebleeds, no nasal discharge, no sore throat, no hoarseness. Eyes: No complaints of eye pain, no red eyes, no discharge from eyes, no itchy eyes. Cardiovascular: No complaints of slow heart rate, no fast heart rate, no chest pain, no palpitations, no leg claudication, no lower extremity edema. Respiratory: No complaints of shortness of breath, no wheezing, no cough, no SOB on exertion, no orthopnea. Gastrointestinal: As noted in HPI  Genitourinary: No complaints of dysuria, no incontinence, no hesitancy, no nocturia. Musculoskeletal: No complaints of arthralgia, no myalgias, no joint swelling or stiffness, no limb pain or swelling. Neurological: No complaints of headache, no confusion, no convulsions, no numbness or tingling, no dizziness or fainting, no limb weakness, no difficulty walking. Skin: No complaints of skin rash or skin lesions, no itching, no skin wound, no dry skin. Hematological/Lymphatic: No complaints of swollen glands, does not bleed easy. Allergic/Immunologic: No immunocompromised state. Endocrine:  No complaints of polyuria, no polydipsia. Psychiatric/Behavioral: is not suicidal, no sleep disturbances, no anxiety or depression, no change in personality, no emotional problems. Historical Information   Past Medical History:   Diagnosis Date   • Cancer ([address])     prostate   • Diabetes ([address])    • Diabetes mellitus ([address])    • Diverticulitis    • HTN (hypertension)    • Hx of CABG    • Hypertension    • Inguinal hernia     R   • Prostate cancer ([address])      Past Surgical History:   Procedure Laterality Date   • COLONOSCOPY     • JOINT REPLACEMENT     • LAPAROSCOPIC COLON RESECTION       Social History   Social History     Substance and Sexual Activity   Alcohol Use Never     Social History     Substance and Sexual Activity   Drug Use Never     Social History     Tobacco Use   Smoking Status Never   Smokeless Tobacco Never     History reviewed. No pertinent family history. Current Medications: has a current medication list which includes the following prescription(s): aspirin, atorvastatin, celecoxib, metformin, methocarbamol, metoprolol succinate, olmesartan-hydrochlorothiazide, ondansetron, and pantoprazole. Vital Signs: /80   Pulse 73   Ht 5' 6" (1.676 m)   Wt 84.4 kg (186 lb)   SpO2 98%   BMI 30.02 kg/m²       Physical Exam:   Constitutional  General Appearance: No acute distress, well appearing and well nourished  Head  Normocephalic  Eyes  Conjunctivae and lids: No swelling, erythema, or discharge. Pupils and irises: Equal, round and reactive to light. Ears, Nose, Mouth, and Throat  External inspection of ears and nose: Normal  Nasal mucosa, septum and turbinates: Normal without edema or erythema/   Oropharynx: Normal with no erythema, edema, exudate or lesions. Neck  Normal range of motion. Neck supple.    Cardiovascular  Auscultation of the heart: Normal rate and rhythm, normal S1 and S2 without murmurs. Examination of the extremities for edema and/or varicosities: Normal  Pulmonary/Chest  Respiratory effort: No increased work of breathing or signs of respiratory distress. Auscultation of lungs: Clear to auscultation, equal breath sounds bilaterally, no wheezes, rales, no rhonchi. Abdomen  Abdomen: Non-tender, no masses. Liver and spleen: No hepatomegaly or splenomegaly. Musculoskeletal  Gait and station: normal.  Digits and Nails: normal without clubbing or cyanosis. Inspection/palpation of joints, bones, and muscles: Normal  Neurological  No nystagmus or asterixis. Skin  Skin and subcutaneous tissue: Normal without rashes or lesions. Lymphatic  Palpation of the lymph nodes in neck: No lymphadenopathy. Psychiatric  Orientation to person, place and time: Normal.  Mood and affect: Normal.         Labs:  Lab Results   Component Value Date    ALT 18 05/24/2023    AST 17 05/24/2023    BUN 29 (H) 05/24/2023    CALCIUM 10.2 05/24/2023     05/24/2023    CO2 27 05/24/2023    CREATININE 1.02 05/24/2023    HCT 47.9 05/24/2023    HGB 16.6 05/24/2023    HGBA1C 8.6 (H) 11/14/2022     05/24/2023    K 4.0 05/24/2023    WBC 7.39 05/24/2023         X-Rays & Procedures:   No orders to display           ______________________________________________________________________      Assessment & Plan:     Diagnoses and all orders for this visit:    Hematemesis without nausea  -     EGD; Future  -     CBC; Future      Patient will undergo repeat EGD and CBC. He is back on Protonix and will stay on it.   Further recommendations will depend on study results

## 2023-08-02 LAB — HBA1C MFR BLD HPLC: 7.3 %

## 2023-08-02 NOTE — PATIENT INSTRUCTIONS
Scheduled date of EGD(as of today): 8/10/23  Physician performing EGD: Ray Carmona  Location of EGD: [facility]  Instructions reviewed with patient by: Alireza Dry T  Clearances:

## 2023-08-04 ENCOUNTER — HOSPITAL ENCOUNTER (EMERGENCY)
Facility: HOSPITAL | Age: 68
Discharge: HOME | End: 2023-08-04
Attending: EMERGENCY MEDICINE
Payer: COMMERCIAL

## 2023-08-04 VITALS
TEMPERATURE: 97.8 F | HEART RATE: 79 BPM | RESPIRATION RATE: 16 BRPM | OXYGEN SATURATION: 99 % | DIASTOLIC BLOOD PRESSURE: 88 MMHG | SYSTOLIC BLOOD PRESSURE: 166 MMHG

## 2023-08-04 DIAGNOSIS — K04.7 DENTAL INFECTION: Primary | ICD-10-CM

## 2023-08-04 DIAGNOSIS — R22.0 RIGHT FACIAL SWELLING: ICD-10-CM

## 2023-08-04 PROCEDURE — 99283 EMERGENCY DEPT VISIT LOW MDM: CPT

## 2023-08-04 PROCEDURE — 99284 EMERGENCY DEPT VISIT MOD MDM: CPT | Performed by: EMERGENCY MEDICINE

## 2023-08-04 RX ORDER — AMOXICILLIN AND CLAVULANATE POTASSIUM 875; 125 MG/1; MG/1
1 TABLET, FILM COATED ORAL ONCE
Status: COMPLETED | OUTPATIENT
Start: 2023-08-04 | End: 2023-08-04

## 2023-08-04 RX ORDER — AMOXICILLIN AND CLAVULANATE POTASSIUM 875; 125 MG/1; MG/1
1 TABLET, FILM COATED ORAL EVERY 12 HOURS
Qty: 14 TABLET | Refills: 0 | Status: SHIPPED | OUTPATIENT
Start: 2023-08-04 | End: 2023-08-11

## 2023-08-04 RX ADMIN — AMOXICILLIN AND CLAVULANATE POTASSIUM 1 TABLET: 875; 125 TABLET, FILM COATED ORAL at 11:26

## 2023-08-04 NOTE — ED PROVIDER NOTES
Pt Name: Jone Glover  MRN: [id number]  [address] 1955  Age/Sex: 76 y.o. male  Date of evaluation: 8/4/2023  PCP: Chelsie Cason    Chief Complaint   Patient presents with   • Facial Swelling     Patient co right sided facial swelling ( near nose and upper lip) that's started a few days ago. Denies trouble breathing or injuries to face. HPI    76 y.o. male presenting with right-sided facial swelling. Patient states that he began having some mild swelling to the right side of the face, he woke today and had increased swelling accompanied by some pain. The swelling is in the face next to the nose on the right side, he notes some redness as well as swelling and tenderness to palpation the area, he also notes some pain at one of his right upper teeth just below the area. He denies any swelling in the throat, trouble breathing, trouble swallowing, trouble speaking, trauma, fevers, other symptoms. HPI      Past Medical and Surgical History    Past Medical History:   Diagnosis Date   • Cancer ([address] [facility])     prostate   • Diabetes ([address] [facility])    • Diabetes mellitus ([address])    • Diverticulitis    • HTN (hypertension)    • Hx of CABG    • Hypertension    • Inguinal hernia     R   • Prostate cancer [facility])        Past Surgical History:   Procedure Laterality Date   • COLONOSCOPY     • JOINT REPLACEMENT     • LAPAROSCOPIC COLON RESECTION         History reviewed. No pertinent family history. Social History     Tobacco Use   • Smoking status: Never   • Smokeless tobacco: Never   Vaping Use   • Vaping Use: Never used   Substance Use Topics   • Alcohol use: Never   • Drug use: Never           Allergies    No Known Allergies    Home Medications    Prior to Admission medications    Medication Sig Start Date End Date Taking?  Authorizing Provider   aspirin 81 mg chewable tablet Chew    Historical Provider, MD   atorvastatin (LIPITOR) 40 mg tablet Take 1 tablet by mouth daily 6/29/22   Historical Provider, MD   celecoxib (CeleBREX) 200 mg capsule Take 200 mg by mouth 2 (two) times a day 9/22/22   Historical Provider, MD   metFORMIN (GLUCOPHAGE) 1000 MG tablet TAKE 1 TABLET BY MOUTH 2 TIMES A DAY WITH MORNING AND EVENING MEALS 5/22/23   Historical Provider, MD   methocarbamol (ROBAXIN) 500 mg tablet Take 1 tablet (500 mg total) by mouth 2 (two) times a day 4/12/23   Sera Horan MD   metoprolol succinate (TOPROL-XL) 50 mg 24 hr tablet Take 1 tablet by mouth daily 6/11/22   Historical Provider, MD   olmesartan-hydrochlorothiazide (BENICAR HCT) 20-12.5 MG per tablet Take 1 tablet by mouth daily 3/16/23   Historical Provider, MD   ondansetron (ZOFRAN) 4 mg tablet Take 1 tablet (4 mg total) by mouth every 6 (six) hours 5/24/23   Abdi Fernandez MD   pantoprazole (PROTONIX) 40 mg tablet TAKE 1 TABLET BY MOUTH EVERY DAY 7/5/23   Guzman Covington PA-C           Review of Systems    Review of Systems   Constitutional: Negative for appetite change, chills and diaphoresis. HENT: Positive for dental problem and facial swelling. Negative for drooling, trouble swallowing and voice change. Respiratory: Negative for apnea, shortness of breath and wheezing. Cardiovascular: Negative for chest pain and leg swelling. Gastrointestinal: Negative for abdominal distention, abdominal pain, diarrhea, nausea and vomiting. Genitourinary: Negative for dysuria and urgency. Musculoskeletal: Negative for arthralgias, back pain, gait problem and neck pain. Skin: Negative for color change, rash and wound. Neurological: Negative for seizures, speech difficulty, weakness and headaches. Psychiatric/Behavioral: Negative for agitation, behavioral problems and dysphoric mood. The patient is not nervous/anxious. All other systems reviewed and negative.     Physical Exam      ED Triage Vitals [08/04/23 1040]   Temperature Pulse Respirations Blood Pressure SpO2   97.8 °F (36.6 °C) 79 16 166/88 99 %      Temp Source Heart Rate Source Patient Position - Orthostatic VS BP Location FiO2 (%)   Tympanic Monitor Sitting Left arm --      Pain Score       --               Physical Exam  Vitals and nursing note reviewed. Constitutional:       Appearance: He is well-developed. HENT:      Head: Normocephalic and atraumatic. Right Ear: External ear normal.      Left Ear: External ear normal.      Nose: Nose normal. No congestion or rhinorrhea. Mouth/Throat:      Mouth: Mucous membranes are moist.      Pharynx: Oropharynx is clear. Comments: Swelling noted to the right maxillary area, swelling and tenderness also noted to the gumline above the right upper lateral incisor, no fluctuance. Airway widely patent, handling secretions and phonating normally  Eyes:      Conjunctiva/sclera: Conjunctivae normal.      Pupils: Pupils are equal, round, and reactive to light. Neck:      Trachea: No tracheal deviation. Cardiovascular:      Rate and Rhythm: Normal rate and regular rhythm. Heart sounds: Normal heart sounds. No murmur heard. Pulmonary:      Effort: Pulmonary effort is normal. No respiratory distress. Breath sounds: Normal breath sounds. No stridor. No wheezing or rales. Abdominal:      General: There is no distension. Palpations: Abdomen is soft. Tenderness: There is no abdominal tenderness. There is no guarding or rebound. Musculoskeletal:         General: No deformity. Normal range of motion. Cervical back: Normal range of motion and neck supple. Skin:     General: Skin is warm and dry. Findings: No rash. Neurological:      Mental Status: He is alert and oriented to person, place, and time. Psychiatric:         Behavior: Behavior normal.         Thought Content:  Thought content normal.         Judgment: Judgment normal.              Diagnostic Results      Labs:    Results Reviewed     None          All labs reviewed and utilized in the medical decision making process    Radiology:    No orders to display       All radiology studies independently viewed by me and interpreted by the radiologist.    Procedure    Procedures        ED Course of Care and Re-Assessments      Overall presentation consistent with odontogenic infection, some suspicion for periapical abscess over the affected tooth. Started on Augmentin. Medications   amoxicillin-clavulanate (AUGMENTIN) 875-125 mg per tablet 1 tablet (1 tablet Oral Given 8/4/23 1126)           FINAL IMPRESSION    Final diagnoses:   Dental infection   Right facial swelling         DISPOSITION/PLAN    Presentation as above felt most consistent with right facial swelling secondary to odontogenic infection, with some suspicion for periapical abscess. Vital signs reassuring other than elevated blood pressure felt to be situational, examination as above. No evidence of Ludewig's angina or other airway compromise at this time, low suspicion for sepsis or other systemic infection. Patient counseled regarding suspected origin of infection, started on Augmentin, patient intends to follow-up with his dentist but also given oral surgery referral as backup should he require tooth extraction. Discharged with strict return precautions. Time reflects when diagnosis was documented in both MDM as applicable and the Disposition within this note     Time User Action Codes Description Comment    8/4/2023 11:24 AM Dee Gutiérrez Add [K04.7] Dental infection     8/4/2023 11:24 AM Clifford COSTA Add [R22.0] Right facial swelling       ED Disposition     ED Disposition   Discharge    Condition   Stable    Date/Time   Fri Aug 4, 2023 11:24 AM    Comment   Angeles Valiente discharge to home/self care.                Follow-up Information     Follow up With Specialties Details Why Contact Info Additional [facility] Emergency Department Emergency Medicine Go to  If symptoms worsen [place] [address] [address] [facility], [place], [place], [address] [address] [facility] Internal Medicine Call  As needed [address]  [phone]       Your dentist  Call today To schedule close follow-up for further care for your dental infection      [address] [facility] [facility] [place]  Call  As needed if your dentist is not able to see you or if you require tooth extraction or other surgical procedure [address] [place] [address] [address]             PATIENT REFERRED TO:    [facility] Emergency Department  [address] [address] [phone]  Go to   If symptoms worsen    Ty Jimenez DO  [address]  [phone]    Call   As needed    Your dentist    Call today  To schedule close follow-up for further care for your dental infection    [address] [facility] [facility] [place] [address]  [phone]  Call   As needed if your dentist is not able to see you or if you require tooth extraction or other surgical procedure      DISCHARGE MEDICATIONS:    Discharge Medication List as of 8/4/2023 11:25 AM      START taking these medications    Details   amoxicillin-clavulanate (AUGMENTIN) 875-125 mg per tablet Take 1 tablet by mouth every 12 (twelve) hours for 7 days, Starting Fri 8/4/2023, Until Fri 8/11/2023, Print         CONTINUE these medications which have NOT CHANGED    Details   aspirin 81 mg chewable tablet Chew, Historical Med      atorvastatin (LIPITOR) 40 mg tablet Take 1 tablet by mouth daily, Starting Wed 6/29/2022, Historical Med      celecoxib (CeleBREX) 200 mg capsule Take 200 mg by mouth 2 (two) times a day, Starting Thu 9/22/2022, Historical Med      metFORMIN (GLUCOPHAGE) 1000 MG tablet TAKE 1 TABLET BY MOUTH 2 TIMES A DAY WITH MORNING AND EVENING MEALS, Historical Med      methocarbamol (ROBAXIN) 500 mg tablet Take 1 tablet (500 mg total) by mouth 2 (two) times a day, Starting Wed 4/12/2023, Normal      metoprolol succinate (TOPROL-XL) 50 mg 24 hr tablet Take 1 tablet by mouth daily, Starting Sat 6/11/2022, Historical Med      olmesartan-hydrochlorothiazide (BENICAR HCT) 20-12.5 MG per tablet Take 1 tablet by mouth daily, Starting Thu 3/16/2023, Historical Med      ondansetron (ZOFRAN) 4 mg tablet Take 1 tablet (4 mg total) by mouth every 6 (six) hours, Starting Wed 5/24/2023, Normal      pantoprazole (PROTONIX) 40 mg tablet TAKE 1 TABLET BY MOUTH EVERY DAY, Normal                      Nabeel Osorio MD    Portions of the record may have been created with voice recognition software. Occasional wrong word or "sound alike" substitutions may have occurred due to the inherent limitations of voice recognition software.   Please read the chart carefully and recognize, using context, where substitutions have occurred     Nabeel Osorio MD  08/04/23 5312

## 2023-08-07 ENCOUNTER — NURSE TRIAGE (OUTPATIENT)
Age: 68
End: 2023-08-07

## 2023-08-07 NOTE — TELEPHONE ENCOUNTER
----- Message from Emily Schwab sent at 8/7/2023 10:07 AM EDT -----  Pt's wife Juanita Mora calling stating that the pt went to ED 8/4/23 w/ swelling on his face and has infection. Pt is currently on Amoxcillin. Pt's wife Juanita Mora would like a call back on this since he has a procedure on 8/10/23 and wants to know if pt can continue w/ this procedure since he is on antibiotic.

## 2023-08-07 NOTE — TELEPHONE ENCOUNTER
I returned call, spoke with Mrs. Ranulfo Ca, she states swelling has gone down and Mr. Ranulfo Ca is no longer in pain. They are scheduled for dental follow up but not unitl after procedure. I advised patient can follow through with EGD as scheduled while on antibiotic.

## 2023-08-09 ENCOUNTER — HOSPITAL ENCOUNTER (EMERGENCY)
Facility: HOSPITAL | Age: 68
Discharge: HOME | End: 2023-08-10
Attending: EMERGENCY MEDICINE
Payer: COMMERCIAL

## 2023-08-09 DIAGNOSIS — R10.31 RIGHT LOWER QUADRANT ABDOMINAL PAIN: ICD-10-CM

## 2023-08-09 DIAGNOSIS — K40.90 RIGHT INGUINAL HERNIA: Primary | ICD-10-CM

## 2023-08-09 PROCEDURE — 80053 COMPREHEN METABOLIC PANEL: CPT | Performed by: EMERGENCY MEDICINE

## 2023-08-09 PROCEDURE — 83690 ASSAY OF LIPASE: CPT | Performed by: EMERGENCY MEDICINE

## 2023-08-09 PROCEDURE — 93005 ELECTROCARDIOGRAM TRACING: CPT

## 2023-08-09 PROCEDURE — 85025 COMPLETE CBC W/AUTO DIFF WBC: CPT | Performed by: EMERGENCY MEDICINE

## 2023-08-09 PROCEDURE — 36415 COLL VENOUS BLD VENIPUNCTURE: CPT | Performed by: EMERGENCY MEDICINE

## 2023-08-09 RX ORDER — MORPHINE SULFATE 4 MG/ML
4 INJECTION, SOLUTION INTRAMUSCULAR; INTRAVENOUS ONCE
Status: COMPLETED | OUTPATIENT
Start: 2023-08-09 | End: 2023-08-09

## 2023-08-09 RX ORDER — SODIUM CHLORIDE, SODIUM LACTATE, POTASSIUM CHLORIDE, CALCIUM CHLORIDE 600; 310; 30; 20 MG/100ML; MG/100ML; MG/100ML; MG/100ML
125 INJECTION, SOLUTION INTRAVENOUS CONTINUOUS
Status: CANCELLED | OUTPATIENT
Start: 2023-08-09

## 2023-08-09 RX ORDER — KETOROLAC TROMETHAMINE 30 MG/ML
15 INJECTION, SOLUTION INTRAMUSCULAR; INTRAVENOUS ONCE
Status: COMPLETED | OUTPATIENT
Start: 2023-08-09 | End: 2023-08-09

## 2023-08-09 RX ADMIN — MORPHINE SULFATE 4 MG: 4 INJECTION INTRAVENOUS at 23:53

## 2023-08-09 RX ADMIN — SODIUM CHLORIDE 1000 ML: 0.9 INJECTION, SOLUTION INTRAVENOUS at 23:55

## 2023-08-09 RX ADMIN — KETOROLAC TROMETHAMINE 15 MG: 30 INJECTION, SOLUTION INTRAMUSCULAR; INTRAVENOUS at 23:52

## 2023-08-10 ENCOUNTER — APPOINTMENT (EMERGENCY)
Dept: CT IMAGING | Facility: HOSPITAL | Age: 68
End: 2023-08-10
Payer: COMMERCIAL

## 2023-08-10 VITALS
OXYGEN SATURATION: 95 % | HEART RATE: 84 BPM | RESPIRATION RATE: 16 BRPM | DIASTOLIC BLOOD PRESSURE: 84 MMHG | TEMPERATURE: 98.3 F | SYSTOLIC BLOOD PRESSURE: 134 MMHG

## 2023-08-10 LAB
ALBUMIN SERPL BCP-MCNC: 4.3 G/DL (ref 3.5–5)
ALP SERPL-CCNC: 86 U/L (ref 34–104)
ALT SERPL W P-5'-P-CCNC: 19 U/L (ref 7–52)
ANION GAP SERPL CALCULATED.3IONS-SCNC: 5 MMOL/L
AST SERPL W P-5'-P-CCNC: 12 U/L (ref 13–39)
ATRIAL RATE: 83 BPM
BASOPHILS # BLD AUTO: 0.02 THOUSANDS/ÂΜL (ref 0–0.1)
BASOPHILS NFR BLD AUTO: 1 % (ref 0–1)
BILIRUB SERPL-MCNC: 0.36 MG/DL (ref 0.2–1)
BUN SERPL-MCNC: 21 MG/DL (ref 5–25)
CALCIUM SERPL-MCNC: 10.2 MG/DL (ref 8.4–10.2)
CHLORIDE SERPL-SCNC: 100 MMOL/L (ref 96–108)
CO2 SERPL-SCNC: 32 MMOL/L (ref 21–32)
CREAT SERPL-MCNC: 0.98 MG/DL (ref 0.6–1.3)
EOSINOPHIL # BLD AUTO: 0.08 THOUSAND/ÂΜL (ref 0–0.61)
EOSINOPHIL NFR BLD AUTO: 2 % (ref 0–6)
ERYTHROCYTE [DISTWIDTH] IN BLOOD BY AUTOMATED COUNT: 12 % (ref 11.6–15.1)
GFR SERPL CREATININE-BSD FRML MDRD: 78 ML/MIN/1.73SQ M
GLUCOSE SERPL-MCNC: 214 MG/DL (ref 65–140)
HCT VFR BLD AUTO: 38.7 % (ref 36.5–49.3)
HGB BLD-MCNC: 13.4 G/DL (ref 12–17)
IMM GRANULOCYTES # BLD AUTO: 0.02 THOUSAND/UL (ref 0–0.2)
IMM GRANULOCYTES NFR BLD AUTO: 1 % (ref 0–2)
LACTATE SERPL-SCNC: 1.2 MMOL/L (ref 0.5–2)
LIPASE SERPL-CCNC: 42 U/L (ref 11–82)
LYMPHOCYTES # BLD AUTO: 1.15 THOUSANDS/ÂΜL (ref 0.6–4.47)
LYMPHOCYTES NFR BLD AUTO: 27 % (ref 14–44)
MCH RBC QN AUTO: 30.2 PG (ref 26.8–34.3)
MCHC RBC AUTO-ENTMCNC: 34.6 G/DL (ref 31.4–37.4)
MCV RBC AUTO: 87 FL (ref 82–98)
MONOCYTES # BLD AUTO: 0.41 THOUSAND/ÂΜL (ref 0.17–1.22)
MONOCYTES NFR BLD AUTO: 10 % (ref 4–12)
NEUTROPHILS # BLD AUTO: 2.63 THOUSANDS/ÂΜL (ref 1.85–7.62)
NEUTS SEG NFR BLD AUTO: 59 % (ref 43–75)
NRBC BLD AUTO-RTO: 0 /100 WBCS
P AXIS: 44 DEGREES
PLATELET # BLD AUTO: 233 THOUSANDS/UL (ref 149–390)
PMV BLD AUTO: 10.2 FL (ref 8.9–12.7)
POTASSIUM SERPL-SCNC: 4.3 MMOL/L (ref 3.5–5.3)
PR INTERVAL: 154 MS
PROT SERPL-MCNC: 7.6 G/DL (ref 6.4–8.4)
QRS AXIS: 8 DEGREES
QRSD INTERVAL: 132 MS
QT INTERVAL: 400 MS
QTC INTERVAL: 470 MS
RBC # BLD AUTO: 4.44 MILLION/UL (ref 3.88–5.62)
SODIUM SERPL-SCNC: 137 MMOL/L (ref 135–147)
T WAVE AXIS: 21 DEGREES
VENTRICULAR RATE: 83 BPM
WBC # BLD AUTO: 4.31 THOUSAND/UL (ref 4.31–10.16)

## 2023-08-10 PROCEDURE — 83605 ASSAY OF LACTIC ACID: CPT | Performed by: EMERGENCY MEDICINE

## 2023-08-10 PROCEDURE — 93010 ELECTROCARDIOGRAM REPORT: CPT | Performed by: INTERNAL MEDICINE

## 2023-08-10 PROCEDURE — 74177 CT ABD & PELVIS W/CONTRAST: CPT

## 2023-08-10 PROCEDURE — 36415 COLL VENOUS BLD VENIPUNCTURE: CPT | Performed by: EMERGENCY MEDICINE

## 2023-08-10 PROCEDURE — G1004 CDSM NDSC: HCPCS

## 2023-08-10 RX ORDER — HYDROMORPHONE HCL/PF 1 MG/ML
0.5 SYRINGE (ML) INJECTION ONCE
Status: COMPLETED | OUTPATIENT
Start: 2023-08-10 | End: 2023-08-10

## 2023-08-10 RX ADMIN — IOHEXOL 100 ML: 350 INJECTION, SOLUTION INTRAVENOUS at 00:30

## 2023-08-10 RX ADMIN — HYDROMORPHONE HYDROCHLORIDE 0.5 MG: 1 INJECTION, SOLUTION INTRAMUSCULAR; INTRAVENOUS; SUBCUTANEOUS at 01:11

## 2023-08-10 NOTE — ED PROVIDER NOTES
History  Chief Complaint   Patient presents with   • Pelvic Pain     Patient arrives to the ER from home ambulatory with complaints of pelvic pain with hx. Hernia hx. Prostate cancer. -testicular pain. -fever/chills - ua symptoms -n/v/d     70-year-old male history of CAD, hypertension, diabetes, prostate cancer presenting with right lower quadrant abdominal pain. Patient reports acute onset of achy right lower quadrant abdominal pain beginning at rest a few hours ago. States the pain came on strong. Denies any nausea vomiting diarrhea. Denies any radiation. Denies any urinary changes. Denies any testicular pain. Denies any chest pain shortness of breath. Reports previous history of intestinal resection secondary to diverticulitis in the past which he reportedly had in his right lower quadrant. Denies any other complaints. Chart reviewed. Past Medical History:  No date: Cancer [facility])      Comment:  prostate  No date: Diabetes ([facility])  No date: Diabetes mellitus ([facility])  No date: Diverticulitis  No date: HTN (hypertension)  No date: Hx of CABG  No date: Hypertension  No date: Inguinal hernia      Comment:  R  No date: Prostate cancer ([facility])  Family History: non-contributory  Social History            Prior to Admission Medications   Prescriptions Last Dose Informant Patient Reported?  Taking?   amoxicillin-clavulanate (AUGMENTIN) 875-125 mg per tablet   No No   Sig: Take 1 tablet by mouth every 12 (twelve) hours for 7 days   aspirin 81 mg chewable tablet  Self Yes No   Sig: Chew   atorvastatin (LIPITOR) 40 mg tablet  Self Yes No   Sig: Take 1 tablet by mouth daily   celecoxib (CeleBREX) 200 mg capsule  Self Yes No   Sig: Take 200 mg by mouth 2 (two) times a day   metFORMIN (GLUCOPHAGE) 1000 MG tablet  Self Yes No   Sig: TAKE 1 TABLET BY MOUTH 2 TIMES A DAY WITH MORNING AND EVENING MEALS   methocarbamol (ROBAXIN) 500 mg tablet  Self No No   Sig: Take 1 tablet (500 mg total) by mouth 2 (two) times a day metoprolol succinate (TOPROL-XL) 50 mg 24 hr tablet  Self Yes No   Sig: Take 1 tablet by mouth daily   olmesartan-hydrochlorothiazide (BENICAR HCT) 20-12.5 MG per tablet  Self Yes No   Sig: Take 1 tablet by mouth daily   ondansetron (ZOFRAN) 4 mg tablet  Self No No   Sig: Take 1 tablet (4 mg total) by mouth every 6 (six) hours   pantoprazole (PROTONIX) 40 mg tablet  Self No No   Sig: TAKE 1 TABLET BY MOUTH EVERY DAY      Facility-Administered Medications: None       Past Medical History:   Diagnosis Date   • Cancer ([facility])     prostate   • Diabetes ([facility])    • Diabetes mellitus ([facility])    • Diverticulitis    • HTN (hypertension)    • Hx of CABG    • Hypertension    • Inguinal hernia     R   • Prostate cancer ([facility])        Past Surgical History:   Procedure Laterality Date   • COLONOSCOPY     • JOINT REPLACEMENT     • LAPAROSCOPIC COLON RESECTION         History reviewed. No pertinent family history. I have reviewed and agree with the history as documented. E-Cigarette/Vaping   • E-Cigarette Use Never User      E-Cigarette/Vaping Substances   • Nicotine No    • THC No    • CBD No    • Flavoring No    • Other No    • Unknown No      Social History     Tobacco Use   • Smoking status: Never   • Smokeless tobacco: Never   Vaping Use   • Vaping Use: Never used   Substance Use Topics   • Alcohol use: Never   • Drug use: Never       Review of Systems   Constitutional: Negative for appetite change, chills, diaphoresis, fever and unexpected weight change. HENT: Negative for congestion and rhinorrhea. Eyes: Negative for photophobia and visual disturbance. Respiratory: Negative for cough, chest tightness and shortness of breath. Cardiovascular: Negative for chest pain, palpitations and leg swelling. Gastrointestinal: Positive for abdominal pain. Negative for abdominal distention, blood in stool, constipation, diarrhea, nausea and vomiting. Genitourinary: Negative for dysuria and hematuria.    Musculoskeletal: Negative for back pain, joint swelling, neck pain and neck stiffness. Skin: Negative for color change, pallor, rash and wound. Neurological: Negative for dizziness, syncope, weakness, light-headedness and headaches. Psychiatric/Behavioral: Negative for agitation. All other systems reviewed and are negative. Physical Exam  Physical Exam  Vitals and nursing note reviewed. Constitutional:       General: He is not in acute distress. Appearance: Normal appearance. He is well-developed. He is not ill-appearing, toxic-appearing or diaphoretic. HENT:      Head: Normocephalic and atraumatic. Nose: Nose normal. No congestion or rhinorrhea. Mouth/Throat:      Mouth: Mucous membranes are moist.      Pharynx: Oropharynx is clear. No oropharyngeal exudate or posterior oropharyngeal erythema. Eyes:      General: No scleral icterus. Right eye: No discharge. Left eye: No discharge. Extraocular Movements: Extraocular movements intact. Conjunctiva/sclera: Conjunctivae normal.      Pupils: Pupils are equal, round, and reactive to light. Neck:      Vascular: No JVD. Trachea: No tracheal deviation. Comments: Supple. Normal range of motion. Cardiovascular:      Rate and Rhythm: Normal rate and regular rhythm. Heart sounds: Normal heart sounds. No murmur heard. No friction rub. No gallop. Comments: Normal rate and regular rhythm  Pulmonary:      Effort: Pulmonary effort is normal. No respiratory distress. Breath sounds: Normal breath sounds. No stridor. No wheezing or rales. Comments: Clear to auscultation bilaterally  Chest:      Chest wall: No tenderness. Abdominal:      General: Bowel sounds are normal. There is no distension. Palpations: Abdomen is soft. Tenderness: There is abdominal tenderness. There is guarding. There is no right CVA tenderness, left CVA tenderness or rebound.       Comments: Tenderness palpation and guarding right lower quadrant. Otherwise soft and nondistended. Normal bowel sounds throughout   Musculoskeletal:         General: No swelling, tenderness, deformity or signs of injury. Normal range of motion. Cervical back: Normal range of motion and neck supple. No rigidity. No muscular tenderness. Right lower leg: No edema. Left lower leg: No edema. Lymphadenopathy:      Cervical: No cervical adenopathy. Skin:     General: Skin is warm and dry. Coloration: Skin is not pale. Findings: No erythema or rash. Neurological:      General: No focal deficit present. Mental Status: He is alert. Mental status is at baseline. Sensory: No sensory deficit. Motor: No weakness or abnormal muscle tone. Coordination: Coordination normal.      Gait: Gait normal.      Comments: Alert. Strength and sensation grossly intact. Ambulatory without difficulty at baseline. Psychiatric:         Behavior: Behavior normal.         Thought Content:  Thought content normal.         Vital Signs  ED Triage Vitals   Temperature Pulse Respirations Blood Pressure SpO2   08/09/23 2332 08/09/23 2330 08/09/23 2330 08/09/23 2330 08/09/23 2330   98.3 °F (36.8 °C) 87 16 (!) 199/103 99 %      Temp src Heart Rate Source Patient Position - Orthostatic VS BP Location FiO2 (%)   -- 08/09/23 2330 08/09/23 2330 08/09/23 2345 --    Monitor Lying Right arm       Pain Score       08/10/23 0111       8           Vitals:    08/10/23 0230 08/10/23 0300 08/10/23 0330 08/10/23 0400   BP: 135/84 135/74 124/78 134/84   Pulse: 87 83 79 84   Patient Position - Orthostatic VS: Lying Lying Lying Lying         Visual Acuity  Visual Acuity    Flowsheet Row Most Recent Value   L Pupil Size (mm) 3   R Pupil Size (mm) 3          ED Medications  Medications   sodium chloride 0.9 % bolus 1,000 mL (0 mL Intravenous Stopped 8/10/23 0055)   ketorolac (TORADOL) injection 15 mg (15 mg Intravenous Given 8/9/23 2742)   morphine injection 4 mg (4 mg Intravenous Given 8/9/23 2353)   iohexol (OMNIPAQUE) 350 MG/ML injection (SINGLE-DOSE) 100 mL (100 mL Intravenous Given 8/10/23 0030)   HYDROmorphone (DILAUDID) injection 0.5 mg (0.5 mg Intravenous Given 8/10/23 0111)       Diagnostic Studies  Results Reviewed     Procedure Component Value Units Date/Time    Lactic acid, plasma (w/reflex if result > 2.0) [[id number]]  (Normal) Collected: 08/10/23 0313    Lab Status: Final result Specimen: Blood from Arm, Left Updated: 08/10/23 0332     LACTIC ACID 1.2 mmol/L     Narrative:      Result may be elevated if tourniquet was used during collection.     Lipase [[id number]]  (Normal) Collected: 08/09/23 2353    Lab Status: Final result Specimen: Blood from Arm, Left Updated: 08/10/23 0020     Lipase 42 u/L     Comprehensive metabolic panel [[id number]]  (Abnormal) Collected: 08/09/23 2353    Lab Status: Final result Specimen: Blood from Arm, Left Updated: 08/10/23 0020     Sodium 137 mmol/L      Potassium 4.3 mmol/L      Chloride 100 mmol/L      CO2 32 mmol/L      ANION GAP 5 mmol/L      BUN 21 mg/dL      Creatinine 0.98 mg/dL      Glucose 214 mg/dL      Calcium 10.2 mg/dL      AST 12 U/L      ALT 19 U/L      Alkaline Phosphatase 86 U/L      Total Protein 7.6 g/dL      Albumin 4.3 g/dL      Total Bilirubin 0.36 mg/dL      eGFR 78 ml/min/1.73sq m     Narrative:      [place] guidelines for Chronic Kidney Disease (CKD):   •  Stage 1 with normal or high GFR (GFR > 90 mL/min/1.73 square meters)  •  Stage 2 Mild CKD (GFR = 60-89 mL/min/1.73 square meters)  •  Stage 3A Moderate CKD (GFR = 45-59 mL/min/1.73 square meters)  •  Stage 3B Moderate CKD (GFR = 30-44 mL/min/1.73 square meters)  •  Stage 4 Severe CKD (GFR = 15-29 mL/min/1.73 square meters)  •  Stage 5 End Stage CKD (GFR <15 mL/min/1.73 square meters)  Note: GFR calculation is accurate only with a steady state creatinine    CBC and differential [[id number]] Collected: 08/09/23 4749    Lab Status: Final result Specimen: Blood from Arm, Left Updated: 08/10/23 0009     WBC 4.31 Thousand/uL      RBC 4.44 Million/uL      Hemoglobin 13.4 g/dL      Hematocrit 38.7 %      MCV 87 fL      MCH 30.2 pg      MCHC 34.6 g/dL      RDW 12.0 %      MPV 10.2 fL      Platelets 908 Thousands/uL      nRBC 0 /100 WBCs      Neutrophils Relative 59 %      Immat GRANS % 1 %      Lymphocytes Relative 27 %      Monocytes Relative 10 %      Eosinophils Relative 2 %      Basophils Relative 1 %      Neutrophils Absolute 2.63 Thousands/µL      Immature Grans Absolute 0.02 Thousand/uL      Lymphocytes Absolute 1.15 Thousands/µL      Monocytes Absolute 0.41 Thousand/µL      Eosinophils Absolute 0.08 Thousand/µL      Basophils Absolute 0.02 Thousands/µL     UA w Reflex to Microscopic w Reflex to Culture [[id number]]     Lab Status: No result Specimen: Urine                  CT abdomen pelvis with contrast   Final Result by Yessica Whitley MD (08/10 0211)         1. Right inguinal hernia containing loop of distal ileum resulting in early or partial small bowel obstruction. Recommend surgical consultation. 2. Normal appendix. I personally discussed this study with Jeison Oseguera on 8/10/2023 2:09 AM.                        Workstation performed: [id number]                    Procedures  Procedures         ED Course                                             Medical Decision Making  69-year-old male history of CAD, hypertension, diabetes, prostate cancer presenting with right lower quadrant abdominal pain. Acute onset of pain at rest with overlying tenderness and guarding concerning for possible intra-abdominal etiology. Plan for labs including abdominal labs. CT abdomen pelvis. UA. Symptom management with IV pain medication. Reassess. Labs interpreted by me without significant acute process. Normal lactate. Inguinal hernia reduced with resolution of symptoms.   CT imaging from before her hernia was reduced showing possible small bowel obstruction secondary to hernia. Discussed case with surgery and now that hernia reduced and symptoms resolved okay to follow-up outpatient. Discussed results and recommendations. Advised follow up PCP and general surgery. Medication recommendations. Given instructions and return precautions. Patient/family at bedside acknowledged understanding of all written and verbal instructions and return precautions. Discharged. Amount and/or Complexity of Data Reviewed  Labs: ordered. Radiology: ordered. Risk  Prescription drug management. Disposition  Final diagnoses:   Right inguinal hernia   Right lower quadrant abdominal pain     Time reflects when diagnosis was documented in both MDM as applicable and the Disposition within this note     Time User Action Codes Description Comment    8/10/2023  2:38 AM Jett Petit Add [K40.90] Right inguinal hernia     8/10/2023  2:38 AM Jett Petit Add [R10.31] Right lower quadrant abdominal pain     8/10/2023  2:38 AM Jett Petit Add [A97.758] Partial small bowel obstruction ([address])     8/10/2023  2:38 AM Jett Petit Modify [K40.90] Right inguinal hernia     8/10/2023  2:38 AM Jett Petit Modify [S93.552] Partial small bowel obstruction ([address])     8/10/2023  4:13 AM Jett Petit Modify [K40.90] Right inguinal hernia     8/10/2023  4:13 AM Jett Petit Modify [K56.600] Partial small bowel obstruction ([address])     8/10/2023  4:13 AM Jett Petit Remove [K56.600] Partial small bowel obstruction [facility])       ED Disposition     ED Disposition   Discharge    Condition   Stable    Date/Time   Thu Aug 10, 2023  4:13 AM    Comment   Danni Mosquera discharge to home/self care.                Follow-up Information     Follow up With Specialties Details Why Contact Info Additional DO [facility] Schedule an appointment as soon as possible for a visit in 1 week  [address] [address] [phone]       [facility] [address] [facility] Surgery Schedule an appointment as soon as possible for a visit in 1 week  [address] [place] [id number] [phone] [address], [address] [place] [address], [id number] [phone]          Patient's Medications   Discharge Prescriptions    No medications on file       No discharge procedures on file.     PDMP Review     None          ED Provider  Electronically Signed by           Lady Corrina MD  08/10/23 1778

## 2023-08-10 NOTE — DISCHARGE INSTRUCTIONS
Please follow up PCP and general surgery. Recommend tylenol 650 mg and ibuprofen 600 mg every 6 hours as needed for pain. Please return for severe chest pain, significant shortness of breath, severely worsening symptoms, or any other concerning signs or symptoms. Please refer to the following documents for additional instructions and return precautions.

## 2023-08-15 ENCOUNTER — TELEPHONE (OUTPATIENT)
Age: 68
End: 2023-08-15

## 2023-08-15 NOTE — TELEPHONE ENCOUNTER
Patients GI provider:  Dr. Olesya Davila    Number to return call: [phone]    Reason for call: Pt's wife, Marcelino Cody calling to reschedule EGD that was Cxld due to ED visit. Doctor's first available is in October. DX in hospital may also affect procedure. Pleas return call to discuss.     Scheduled procedure/appointment date if applicable: Apt/procedure NA

## 2023-08-15 NOTE — TELEPHONE ENCOUNTER
Hospital visit was for an inguinal hernia. This will not affect an EGD. Please schedule him for EGD as soon as possible. He was having vomiting of blood.

## 2023-08-16 ENCOUNTER — ANESTHESIA (OUTPATIENT)
Dept: GASTROENTEROLOGY | Facility: HOSPITAL | Age: 68
End: 2023-08-16

## 2023-08-16 ENCOUNTER — HOSPITAL ENCOUNTER (OUTPATIENT)
Dept: GASTROENTEROLOGY | Facility: HOSPITAL | Age: 68
Setting detail: OUTPATIENT SURGERY
Discharge: HOME | End: 2023-08-16
Attending: INTERNAL MEDICINE
Payer: COMMERCIAL

## 2023-08-16 ENCOUNTER — ANESTHESIA EVENT (OUTPATIENT)
Dept: GASTROENTEROLOGY | Facility: HOSPITAL | Age: 68
End: 2023-08-16

## 2023-08-16 VITALS
RESPIRATION RATE: 18 BRPM | TEMPERATURE: 97.6 F | WEIGHT: 185.19 LBS | HEIGHT: 66 IN | BODY MASS INDEX: 29.76 KG/M2 | SYSTOLIC BLOOD PRESSURE: 131 MMHG | HEART RATE: 84 BPM | OXYGEN SATURATION: 98 % | DIASTOLIC BLOOD PRESSURE: 89 MMHG

## 2023-08-16 DIAGNOSIS — K92.0 HEMATEMESIS WITHOUT NAUSEA: ICD-10-CM

## 2023-08-16 LAB — GLUCOSE SERPL-MCNC: 154 MG/DL (ref 65–140)

## 2023-08-16 PROCEDURE — 82948 REAGENT STRIP/BLOOD GLUCOSE: CPT

## 2023-08-16 RX ORDER — PROPOFOL 10 MG/ML
INJECTION, EMULSION INTRAVENOUS AS NEEDED
Status: DISCONTINUED | OUTPATIENT
Start: 2023-08-16 | End: 2023-08-16

## 2023-08-16 RX ORDER — SODIUM CHLORIDE, SODIUM LACTATE, POTASSIUM CHLORIDE, CALCIUM CHLORIDE 600; 310; 30; 20 MG/100ML; MG/100ML; MG/100ML; MG/100ML
INJECTION, SOLUTION INTRAVENOUS CONTINUOUS PRN
Status: DISCONTINUED | OUTPATIENT
Start: 2023-08-16 | End: 2023-08-16

## 2023-08-16 RX ORDER — LIDOCAINE HYDROCHLORIDE 20 MG/ML
INJECTION, SOLUTION EPIDURAL; INFILTRATION; INTRACAUDAL; PERINEURAL AS NEEDED
Status: DISCONTINUED | OUTPATIENT
Start: 2023-08-16 | End: 2023-08-16

## 2023-08-16 RX ADMIN — PROPOFOL 40 MG: 10 INJECTION, EMULSION INTRAVENOUS at 08:01

## 2023-08-16 RX ADMIN — PROPOFOL 100 MG: 10 INJECTION, EMULSION INTRAVENOUS at 07:59

## 2023-08-16 RX ADMIN — SODIUM CHLORIDE, SODIUM LACTATE, POTASSIUM CHLORIDE, AND CALCIUM CHLORIDE: .6; .31; .03; .02 INJECTION, SOLUTION INTRAVENOUS at 07:48

## 2023-08-16 RX ADMIN — LIDOCAINE HYDROCHLORIDE 5 ML: 20 INJECTION, SOLUTION EPIDURAL; INFILTRATION; INTRACAUDAL at 07:59

## 2023-08-16 NOTE — INTERVAL H&P NOTE
H&P reviewed. After examining the patient I find no changes in the patients condition since the H&P had been written.     Vitals:    08/16/23 0709   BP: (!) 174/97   Pulse: 93   Resp: 18   Temp: 97.6 °F (36.4 °C)   SpO2: 100%

## 2023-08-16 NOTE — ANESTHESIA PREPROCEDURE EVALUATION
Procedure:  EGD    Relevant Problems   ANESTHESIA (within normal limits)   (-) History of anesthesia complications      CARDIO   (+) CAD (coronary artery disease)   (+) Dyslipidemia, goal LDL below 70   (+) Hypertension   (-) Chest pain   (-) RENEE (dyspnea on exertion)      ENDO   (+) DM type 2, goal HbA1c < 7% (HCC)      PULMONARY   (-) Shortness of breath   (-) URI (upper respiratory infection)      CONCLUSIONS:   Normal sinus rhythm   Right bundle branch block   Abnormal ECG   When compared with ECG of 28-MAY-2020 07:22,   No significant change was found     Physical Exam    Airway    Mallampati score: II  TM Distance: >3 FB  Neck ROM: full     Dental       Cardiovascular      Pulmonary      Other Findings        Anesthesia Plan  ASA Score- 3     Anesthesia Type- IV sedation with anesthesia with ASA Monitors. Additional Monitors:   Airway Plan:           Plan Factors-Exercise tolerance (METS): >4 METS. Chart reviewed. EKG reviewed. Existing labs reviewed. Patient summary reviewed. Induction- intravenous. Postoperative Plan-     Informed Consent- Anesthetic plan and risks discussed with patient. I personally reviewed this patient with the CRNA. Discussed and agreed on the Anesthesia Plan with the CRNA. Mirian Sosa

## 2023-09-21 ENCOUNTER — TELEPHONE (OUTPATIENT)
Dept: SURGERY | Facility: CLINIC | Age: 68
End: 2023-09-21

## 2023-09-21 NOTE — TELEPHONE ENCOUNTER
I called patient stated his last A1C is 7.8 on 8/29/23. I saw in your last note on 2/2/2023 needs to be below that for his surgery. He has appointment with you on Monday 9/25/23. He stated to me that his pain level is 6 on his hernia. He would like to know what you recommend to keep his A1C down. Do you want to reschedule his appointment until his A1C is better?  Please Advise

## 2023-09-25 ENCOUNTER — OFFICE VISIT (OUTPATIENT)
Dept: SURGERY | Facility: CLINIC | Age: 68
End: 2023-09-25
Payer: COMMERCIAL

## 2023-09-25 VITALS
WEIGHT: 187.6 LBS | BODY MASS INDEX: 30.15 KG/M2 | HEIGHT: 66 IN | SYSTOLIC BLOOD PRESSURE: 132 MMHG | DIASTOLIC BLOOD PRESSURE: 88 MMHG | TEMPERATURE: 98.6 F

## 2023-09-25 DIAGNOSIS — K40.20 NON-RECURRENT BILATERAL INGUINAL HERNIA WITHOUT OBSTRUCTION OR GANGRENE: Primary | ICD-10-CM

## 2023-09-25 DIAGNOSIS — E11.69 TYPE 2 DIABETES MELLITUS WITH OTHER SPECIFIED COMPLICATION, UNSPECIFIED WHETHER LONG TERM INSULIN USE (HCC): ICD-10-CM

## 2023-09-25 PROCEDURE — 99214 OFFICE O/P EST MOD 30 MIN: CPT | Performed by: SURGERY

## 2023-09-25 RX ORDER — TAMSULOSIN HYDROCHLORIDE 0.4 MG/1
0.4 CAPSULE ORAL
Qty: 5 CAPSULE | Refills: 0 | Status: SHIPPED | OUTPATIENT
Start: 2023-09-25

## 2023-09-25 NOTE — PROGRESS NOTES
Assessment/Plan:     1. Non-recurrent bilateral inguinal hernia without obstruction or gangrene  -     tamsulosin (FLOMAX) 0.4 mg; Take 1 capsule (0.4 mg total) by mouth daily with dinner  -     Case request operating room: REPAIR HERNIA INGUINAL, LAPAROSCOPIC; Standing  -     CBC and differential; Future  -     Basic metabolic panel; Future    2. Type 2 diabetes mellitus with other specified complication, unspecified whether long term insulin use (HCC)      We discussed the risks and benefits of laparoscopic bilateral inguinal hernia repair including but not limited to mesh placement to decrease risk of recurrence, risk of bleeding, infection, damage to nerves or other structures, acute and chronic pain, recurrence, reactions to medications. Questions answered and consent signed. Subjective:      Patient ID: Sophy Mcdaniel is a 76 y.o. male. Triage Notes:    Mr Ann Malhotra is returning regarding bilateral inguinal hernias. He has improved his DM control and his A1c is lower than prior. Symptomatic on the right. Intermittent constipation - none recently. The following portions of the patient's history were reviewed and updated as appropriate: allergies, current medications, past family history, past medical history, past social history, past surgical history and problem list.    Review of Systems   Constitutional:  Negative for appetite change, chills, fever and unexpected weight change. HENT:  Negative for hearing loss, sore throat, trouble swallowing and voice change. Eyes:  Negative for visual disturbance. Respiratory:  Negative for cough, chest tightness, shortness of breath and wheezing. Cardiovascular:  Negative for chest pain and palpitations. Gastrointestinal:  Negative for abdominal distention and blood in stool. Constipation: improved. Endocrine: Negative for cold intolerance and heat intolerance. Genitourinary:  Negative for difficulty urinating and scrotal swelling. Musculoskeletal:  Gait problem: walking with a cane after TKA . Skin:  Negative for color change and rash. Neurological:  Negative for dizziness, speech difficulty, light-headedness and numbness. Hematological:  Does not bruise/bleed easily. Psychiatric/Behavioral:  Negative for confusion, hallucinations and suicidal ideas. Objective:      /88 (BP Location: Left arm, Patient Position: Sitting, Cuff Size: Standard)   Temp 98.6 °F (37 °C) (Temporal)   Ht 5' 6" (1.676 m)   Wt 85.1 kg (187 lb 9.6 oz)   BMI 30.28 kg/m²     Below is the patient's most recent value for Albumin, ALT, AST, BUN, Calcium, Chloride, Cholesterol, CO2, Creatinine, GFR, Glucose, HDL, Hematocrit, Hemoglobin, Hemoglobin A1C, LDL, Magnesium, Phosphorus, Platelets, Potassium, PSA, Sodium, Triglycerides, and WBC. Lab Results   Component Value Date    ALT 19 08/09/2023    AST 12 (L) 08/09/2023    BUN 21 08/09/2023    CALCIUM 10.2 08/09/2023     08/09/2023    CO2 32 08/09/2023    CREATININE 0.98 08/09/2023    HCT 38.7 08/09/2023    HGB 13.4 08/09/2023    HGBA1C 7.8 (H) 08/29/2023     08/09/2023    K 4.3 08/09/2023    WBC 4.31 08/09/2023     Note: for a comprehensive list of the patient's lab results, access the Results Review activity. Physical Exam  Vitals and nursing note reviewed. Constitutional:       General: He is not in acute distress. Appearance: He is well-developed. HENT:      Head: Normocephalic and atraumatic. Eyes:      General: No scleral icterus. Right eye: No discharge. Left eye: No discharge. Neck:      Vascular: No JVD. Cardiovascular:      Rate and Rhythm: Normal rate and regular rhythm. Pulmonary:      Effort: Pulmonary effort is normal. No respiratory distress. Abdominal:      General: Abdomen is flat. There is no distension. Hernia: A hernia (R>L inguinal hernias) is present.    Genitourinary:     Comments: deferred  Musculoskeletal: General: Normal range of motion. Cervical back: Normal range of motion and neck supple. Skin:     General: Skin is warm and dry. Neurological:      Mental Status: He is alert and oriented to person, place, and time. Psychiatric:         Mood and Affect: Mood normal.         Behavior: Behavior normal.         Thought Content:  Thought content normal.         Judgment: Judgment normal.             Procedures

## 2023-09-25 NOTE — H&P (VIEW-ONLY)
Assessment/Plan:     1. Non-recurrent bilateral inguinal hernia without obstruction or gangrene  -     tamsulosin (FLOMAX) 0.4 mg; Take 1 capsule (0.4 mg total) by mouth daily with dinner  -     Case request operating room: REPAIR HERNIA INGUINAL, LAPAROSCOPIC; Standing  -     CBC and differential; Future  -     Basic metabolic panel; Future    2. Type 2 diabetes mellitus with other specified complication, unspecified whether long term insulin use (HCC)      We discussed the risks and benefits of laparoscopic bilateral inguinal hernia repair including but not limited to mesh placement to decrease risk of recurrence, risk of bleeding, infection, damage to nerves or other structures, acute and chronic pain, recurrence, reactions to medications. Questions answered and consent signed. Subjective:      Patient ID: Patrick Eason is a 76 y.o. male. Triage Notes:    Mr Maeve Dietrich is returning regarding bilateral inguinal hernias. He has improved his DM control and his A1c is lower than prior. Symptomatic on the right. Intermittent constipation - none recently. The following portions of the patient's history were reviewed and updated as appropriate: allergies, current medications, past family history, past medical history, past social history, past surgical history and problem list.    Review of Systems   Constitutional:  Negative for appetite change, chills, fever and unexpected weight change. HENT:  Negative for hearing loss, sore throat, trouble swallowing and voice change. Eyes:  Negative for visual disturbance. Respiratory:  Negative for cough, chest tightness, shortness of breath and wheezing. Cardiovascular:  Negative for chest pain and palpitations. Gastrointestinal:  Negative for abdominal distention and blood in stool. Constipation: improved. Endocrine: Negative for cold intolerance and heat intolerance. Genitourinary:  Negative for difficulty urinating and scrotal swelling. Musculoskeletal:  Gait problem: walking with a cane after TKA . Skin:  Negative for color change and rash. Neurological:  Negative for dizziness, speech difficulty, light-headedness and numbness. Hematological:  Does not bruise/bleed easily. Psychiatric/Behavioral:  Negative for confusion, hallucinations and suicidal ideas. Objective:      /88 (BP Location: Left arm, Patient Position: Sitting, Cuff Size: Standard)   Temp 98.6 °F (37 °C) (Temporal)   Ht 5' 6" (1.676 m)   Wt 85.1 kg (187 lb 9.6 oz)   BMI 30.28 kg/m²     Below is the patient's most recent value for Albumin, ALT, AST, BUN, Calcium, Chloride, Cholesterol, CO2, Creatinine, GFR, Glucose, HDL, Hematocrit, Hemoglobin, Hemoglobin A1C, LDL, Magnesium, Phosphorus, Platelets, Potassium, PSA, Sodium, Triglycerides, and WBC. Lab Results   Component Value Date    ALT 19 08/09/2023    AST 12 (L) 08/09/2023    BUN 21 08/09/2023    CALCIUM 10.2 08/09/2023     08/09/2023    CO2 32 08/09/2023    CREATININE 0.98 08/09/2023    HCT 38.7 08/09/2023    HGB 13.4 08/09/2023    HGBA1C 7.8 (H) 08/29/2023     08/09/2023    K 4.3 08/09/2023    WBC 4.31 08/09/2023     Note: for a comprehensive list of the patient's lab results, access the Results Review activity. Physical Exam  Vitals and nursing note reviewed. Constitutional:       General: He is not in acute distress. Appearance: He is well-developed. HENT:      Head: Normocephalic and atraumatic. Eyes:      General: No scleral icterus. Right eye: No discharge. Left eye: No discharge. Neck:      Vascular: No JVD. Cardiovascular:      Rate and Rhythm: Normal rate and regular rhythm. Pulmonary:      Effort: Pulmonary effort is normal. No respiratory distress. Abdominal:      General: Abdomen is flat. There is no distension. Hernia: A hernia (R>L inguinal hernias) is present.    Genitourinary:     Comments: deferred  Musculoskeletal: General: Normal range of motion. Cervical back: Normal range of motion and neck supple. Skin:     General: Skin is warm and dry. Neurological:      Mental Status: He is alert and oriented to person, place, and time. Psychiatric:         Mood and Affect: Mood normal.         Behavior: Behavior normal.         Thought Content:  Thought content normal.         Judgment: Judgment normal.             Procedures

## 2023-09-25 NOTE — PATIENT INSTRUCTIONS
Start taking the FLOMAX (tamsulosin) 3 days before surgery. Plan for 6 weeks out of work due to lifting requirement.

## 2023-09-27 LAB
BASOPHILS # BLD AUTO: 19 CELLS/UL (ref 0–200)
BASOPHILS NFR BLD AUTO: 0.6 %
BUN SERPL-MCNC: 23 MG/DL (ref 7–25)
BUN/CREAT SERPL: ABNORMAL (CALC) (ref 6–22)
CALCIUM SERPL-MCNC: 9.5 MG/DL (ref 8.6–10.3)
CHLORIDE SERPL-SCNC: 103 MMOL/L (ref 98–110)
CO2 SERPL-SCNC: 31 MMOL/L (ref 20–32)
CREAT SERPL-MCNC: 0.87 MG/DL (ref 0.7–1.35)
EOSINOPHIL # BLD AUTO: 93 CELLS/UL (ref 15–500)
EOSINOPHIL NFR BLD AUTO: 2.9 %
ERYTHROCYTE [DISTWIDTH] IN BLOOD BY AUTOMATED COUNT: 12.5 % (ref 11–15)
GFR/BSA.PRED SERPLBLD CYS-BASED-ARV: 94 ML/MIN/1.73M2
GLUCOSE SERPL-MCNC: 149 MG/DL (ref 65–99)
HCT VFR BLD AUTO: 40.8 % (ref 38.5–50)
HGB BLD-MCNC: 13.5 G/DL (ref 13.2–17.1)
LYMPHOCYTES # BLD AUTO: 1046 CELLS/UL (ref 850–3900)
LYMPHOCYTES NFR BLD AUTO: 32.7 %
MCH RBC QN AUTO: 29.7 PG (ref 27–33)
MCHC RBC AUTO-ENTMCNC: 33.1 G/DL (ref 32–36)
MCV RBC AUTO: 89.7 FL (ref 80–100)
MONOCYTES # BLD AUTO: 326 CELLS/UL (ref 200–950)
MONOCYTES NFR BLD AUTO: 10.2 %
NEUTROPHILS # BLD AUTO: 1715 CELLS/UL (ref 1500–7800)
NEUTROPHILS NFR BLD AUTO: 53.6 %
PLATELET # BLD AUTO: 166 THOUSAND/UL (ref 140–400)
PMV BLD REES-ECKER: 10.7 FL (ref 7.5–12.5)
POTASSIUM SERPL-SCNC: 4.5 MMOL/L (ref 3.5–5.3)
RBC # BLD AUTO: 4.55 MILLION/UL (ref 4.2–5.8)
SODIUM SERPL-SCNC: 139 MMOL/L (ref 135–146)
WBC # BLD AUTO: 3.2 THOUSAND/UL (ref 3.8–10.8)

## 2023-09-29 ENCOUNTER — ANESTHESIA EVENT (OUTPATIENT)
Dept: PERIOP | Facility: HOSPITAL | Age: 68
End: 2023-09-29
Payer: COMMERCIAL

## 2023-10-09 NOTE — PRE-PROCEDURE INSTRUCTIONS
Pre-Surgery Instructions:   Medication Instructions   • aspirin 81 mg chewable tablet Stop taking 7 days prior to surgery. • atorvastatin (LIPITOR) 40 mg tablet Take day of surgery. • metFORMIN (GLUCOPHAGE) 1000 MG tablet Hold day of surgery. • metoprolol succinate (TOPROL-XL) 50 mg 24 hr tablet Take day of surgery. • tamsulosin (FLOMAX) 0.4 mg Take night before surgery      Medication instructions for day surgery reviewed. Please use only a sip of water to take your instructed medications. Avoid all over the counter vitamins, supplements and NSAIDS for one week prior to surgery per anesthesia guidelines. Tylenol is ok to take as needed. You will receive a call one business day prior to surgery with an arrival time and hospital directions. If your surgery is scheduled on a Monday, the hospital will be calling you on the Friday prior to your surgery. If you have not heard from anyone by 8pm, please call the hospital supervisor through the hospital  at [phone]. Alex Vega [phone]). Do not eat or drink anything after midnight the night before your surgery, including candy, mints, lifesavers, or chewing gum. Do not drink alcohol 24hrs before your surgery. Try not to smoke at least 24hrs before your surgery. Follow the pre surgery showering instructions as listed in the [facility] Surgical Experience Booklet” or otherwise provided by your surgeon's office. Do not shave the surgical area 24 hours before surgery. Do not apply any lotions, creams, including makeup, cologne, deodorant, or perfumes after showering on the day of your surgery. No contact lenses, eye make-up, or artificial eyelashes. Remove nail polish, including gel polish, and any artificial, gel, or acrylic nails if possible. Remove all jewelry including rings and body piercing jewelry. Wear causal clothing that is easy to take on and off. Consider your type of surgery. Keep any valuables, jewelry, piercings at home. Please bring any specially ordered equipment (sling, braces) if indicated. Arrange for a responsible person to drive you to and from the hospital on the day of your surgery. Visitor Guidelines discussed. Call the surgeon's office with any new illnesses, exposures, or additional questions prior to surgery. Please reference your [facility] Surgical Experience Booklet” for additional information to prepare for your upcoming surgery.

## 2023-10-11 ENCOUNTER — ANESTHESIA (OUTPATIENT)
Dept: PERIOP | Facility: HOSPITAL | Age: 68
End: 2023-10-11
Payer: COMMERCIAL

## 2023-10-11 ENCOUNTER — HOSPITAL ENCOUNTER (OUTPATIENT)
Facility: HOSPITAL | Age: 68
Setting detail: OUTPATIENT SURGERY
Discharge: HOME | End: 2023-10-11
Attending: SURGERY | Admitting: SURGERY
Payer: COMMERCIAL

## 2023-10-11 VITALS
WEIGHT: 189.6 LBS | HEART RATE: 97 BPM | DIASTOLIC BLOOD PRESSURE: 83 MMHG | RESPIRATION RATE: 15 BRPM | TEMPERATURE: 97.6 F | BODY MASS INDEX: 30.47 KG/M2 | HEIGHT: 66 IN | SYSTOLIC BLOOD PRESSURE: 139 MMHG | OXYGEN SATURATION: 95 %

## 2023-10-11 DIAGNOSIS — K40.20 NON-RECURRENT BILATERAL INGUINAL HERNIA WITHOUT OBSTRUCTION OR GANGRENE: Primary | ICD-10-CM

## 2023-10-11 LAB — GLUCOSE SERPL-MCNC: 182 MG/DL (ref 65–140)

## 2023-10-11 PROCEDURE — 49505 PRP I/HERN INIT REDUC >5 YR: CPT | Performed by: SURGERY

## 2023-10-11 PROCEDURE — 82948 REAGENT STRIP/BLOOD GLUCOSE: CPT

## 2023-10-11 PROCEDURE — 49505 PRP I/HERN INIT REDUC >5 YR: CPT | Performed by: PHYSICIAN ASSISTANT

## 2023-10-11 PROCEDURE — 55520 REMOVAL OF SPERM CORD LESION: CPT | Performed by: SURGERY

## 2023-10-11 PROCEDURE — C1781 MESH (IMPLANTABLE): HCPCS | Performed by: SURGERY

## 2023-10-11 PROCEDURE — 55520 REMOVAL OF SPERM CORD LESION: CPT | Performed by: PHYSICIAN ASSISTANT

## 2023-10-11 PROCEDURE — 88302 TISSUE EXAM BY PATHOLOGIST: CPT | Performed by: PATHOLOGY

## 2023-10-11 DEVICE — BARD SOFT MESH
Type: IMPLANTABLE DEVICE | Site: INGUINAL | Status: FUNCTIONAL
Brand: BARD SOFT MESH

## 2023-10-11 RX ORDER — LIDOCAINE HYDROCHLORIDE 10 MG/ML
0.5 INJECTION, SOLUTION EPIDURAL; INFILTRATION; INTRACAUDAL; PERINEURAL ONCE AS NEEDED
Status: DISCONTINUED | OUTPATIENT
Start: 2023-10-11 | End: 2023-10-11 | Stop reason: HOSPADM

## 2023-10-11 RX ORDER — CEFAZOLIN SODIUM 2 G/50ML
2000 SOLUTION INTRAVENOUS ONCE
Status: COMPLETED | OUTPATIENT
Start: 2023-10-11 | End: 2023-10-11

## 2023-10-11 RX ORDER — SODIUM CHLORIDE, SODIUM LACTATE, POTASSIUM CHLORIDE, CALCIUM CHLORIDE 600; 310; 30; 20 MG/100ML; MG/100ML; MG/100ML; MG/100ML
125 INJECTION, SOLUTION INTRAVENOUS CONTINUOUS
Status: DISCONTINUED | OUTPATIENT
Start: 2023-10-11 | End: 2023-10-11 | Stop reason: HOSPADM

## 2023-10-11 RX ORDER — OXYCODONE HYDROCHLORIDE 5 MG/1
5 TABLET ORAL EVERY 4 HOURS PRN
Qty: 12 TABLET | Refills: 0 | Status: SHIPPED | OUTPATIENT
Start: 2023-10-11 | End: 2023-10-14

## 2023-10-11 RX ORDER — FENTANYL CITRATE/PF 50 MCG/ML
25 SYRINGE (ML) INJECTION
Status: DISCONTINUED | OUTPATIENT
Start: 2023-10-11 | End: 2023-10-11 | Stop reason: HOSPADM

## 2023-10-11 RX ORDER — PROPOFOL 10 MG/ML
INJECTION, EMULSION INTRAVENOUS AS NEEDED
Status: DISCONTINUED | OUTPATIENT
Start: 2023-10-11 | End: 2023-10-11

## 2023-10-11 RX ORDER — HYDROMORPHONE HYDROCHLORIDE 2 MG/ML
INJECTION, SOLUTION INTRAMUSCULAR; INTRAVENOUS; SUBCUTANEOUS AS NEEDED
Status: DISCONTINUED | OUTPATIENT
Start: 2023-10-11 | End: 2023-10-11

## 2023-10-11 RX ORDER — HYDROMORPHONE HCL/PF 1 MG/ML
0.2 SYRINGE (ML) INJECTION
Status: DISCONTINUED | OUTPATIENT
Start: 2023-10-11 | End: 2023-10-11 | Stop reason: HOSPADM

## 2023-10-11 RX ORDER — DOCUSATE SODIUM 100 MG/1
100 CAPSULE, LIQUID FILLED ORAL 2 TIMES DAILY
Qty: 14 CAPSULE | Refills: 0 | Status: SHIPPED | OUTPATIENT
Start: 2023-10-11 | End: 2023-10-18

## 2023-10-11 RX ORDER — MIDAZOLAM HYDROCHLORIDE 2 MG/2ML
INJECTION, SOLUTION INTRAMUSCULAR; INTRAVENOUS AS NEEDED
Status: DISCONTINUED | OUTPATIENT
Start: 2023-10-11 | End: 2023-10-11

## 2023-10-11 RX ORDER — ACETAMINOPHEN 10 MG/ML
1000 INJECTION, SOLUTION INTRAVENOUS ONCE
Status: COMPLETED | OUTPATIENT
Start: 2023-10-11 | End: 2023-10-11

## 2023-10-11 RX ORDER — PROPOFOL 10 MG/ML
INJECTION, EMULSION INTRAVENOUS CONTINUOUS PRN
Status: DISCONTINUED | OUTPATIENT
Start: 2023-10-11 | End: 2023-10-11

## 2023-10-11 RX ORDER — OXYCODONE HYDROCHLORIDE 5 MG/1
5 TABLET ORAL EVERY 4 HOURS PRN
Status: DISCONTINUED | OUTPATIENT
Start: 2023-10-11 | End: 2023-10-11 | Stop reason: HOSPADM

## 2023-10-11 RX ORDER — ROCURONIUM BROMIDE 10 MG/ML
INJECTION, SOLUTION INTRAVENOUS AS NEEDED
Status: DISCONTINUED | OUTPATIENT
Start: 2023-10-11 | End: 2023-10-11

## 2023-10-11 RX ORDER — EPHEDRINE SULFATE 50 MG/ML
INJECTION INTRAVENOUS AS NEEDED
Status: DISCONTINUED | OUTPATIENT
Start: 2023-10-11 | End: 2023-10-11

## 2023-10-11 RX ORDER — ONDANSETRON 2 MG/ML
4 INJECTION INTRAMUSCULAR; INTRAVENOUS ONCE AS NEEDED
Status: DISCONTINUED | OUTPATIENT
Start: 2023-10-11 | End: 2023-10-11 | Stop reason: HOSPADM

## 2023-10-11 RX ORDER — ONDANSETRON 2 MG/ML
4 INJECTION INTRAMUSCULAR; INTRAVENOUS EVERY 6 HOURS PRN
Status: DISCONTINUED | OUTPATIENT
Start: 2023-10-11 | End: 2023-10-11 | Stop reason: HOSPADM

## 2023-10-11 RX ORDER — DEXAMETHASONE SODIUM PHOSPHATE 10 MG/ML
INJECTION, SOLUTION INTRAMUSCULAR; INTRAVENOUS AS NEEDED
Status: DISCONTINUED | OUTPATIENT
Start: 2023-10-11 | End: 2023-10-11

## 2023-10-11 RX ORDER — ONDANSETRON 2 MG/ML
INJECTION INTRAMUSCULAR; INTRAVENOUS AS NEEDED
Status: DISCONTINUED | OUTPATIENT
Start: 2023-10-11 | End: 2023-10-11

## 2023-10-11 RX ORDER — MAGNESIUM HYDROXIDE 1200 MG/15ML
LIQUID ORAL AS NEEDED
Status: DISCONTINUED | OUTPATIENT
Start: 2023-10-11 | End: 2023-10-11 | Stop reason: HOSPADM

## 2023-10-11 RX ORDER — FENTANYL CITRATE 50 UG/ML
INJECTION, SOLUTION INTRAMUSCULAR; INTRAVENOUS AS NEEDED
Status: DISCONTINUED | OUTPATIENT
Start: 2023-10-11 | End: 2023-10-11

## 2023-10-11 RX ORDER — LIDOCAINE HYDROCHLORIDE 10 MG/ML
INJECTION, SOLUTION EPIDURAL; INFILTRATION; INTRACAUDAL; PERINEURAL AS NEEDED
Status: DISCONTINUED | OUTPATIENT
Start: 2023-10-11 | End: 2023-10-11

## 2023-10-11 RX ADMIN — FENTANYL CITRATE 50 MCG: 50 INJECTION, SOLUTION INTRAMUSCULAR; INTRAVENOUS at 09:07

## 2023-10-11 RX ADMIN — CEFAZOLIN SODIUM 2000 MG: 2 SOLUTION INTRAVENOUS at 09:12

## 2023-10-11 RX ADMIN — LIDOCAINE HYDROCHLORIDE 80 MG: 10 INJECTION, SOLUTION EPIDURAL; INFILTRATION; INTRACAUDAL; PERINEURAL at 09:05

## 2023-10-11 RX ADMIN — HYDROMORPHONE HYDROCHLORIDE 0.5 MG: 2 INJECTION, SOLUTION INTRAMUSCULAR; INTRAVENOUS; SUBCUTANEOUS at 09:36

## 2023-10-11 RX ADMIN — DEXAMETHASONE SODIUM PHOSPHATE 4 MG: 10 INJECTION, SOLUTION INTRAMUSCULAR; INTRAVENOUS at 09:17

## 2023-10-11 RX ADMIN — MIDAZOLAM HYDROCHLORIDE 2 MG: 1 INJECTION, SOLUTION INTRAMUSCULAR; INTRAVENOUS at 09:02

## 2023-10-11 RX ADMIN — PROPOFOL 50 MG: 10 INJECTION, EMULSION INTRAVENOUS at 09:07

## 2023-10-11 RX ADMIN — PHENYLEPHRINE HYDROCHLORIDE 40 MCG/MIN: 10 INJECTION INTRAVENOUS at 09:31

## 2023-10-11 RX ADMIN — PROPOFOL 150 MG: 10 INJECTION, EMULSION INTRAVENOUS at 09:05

## 2023-10-11 RX ADMIN — OXYCODONE HYDROCHLORIDE 5 MG: 5 TABLET ORAL at 12:25

## 2023-10-11 RX ADMIN — ROCURONIUM BROMIDE 20 MG: 10 INJECTION, SOLUTION INTRAVENOUS at 09:48

## 2023-10-11 RX ADMIN — SODIUM CHLORIDE, SODIUM LACTATE, POTASSIUM CHLORIDE, AND CALCIUM CHLORIDE: .6; .31; .03; .02 INJECTION, SOLUTION INTRAVENOUS at 08:47

## 2023-10-11 RX ADMIN — SUGAMMADEX 200 MG: 100 INJECTION, SOLUTION INTRAVENOUS at 11:01

## 2023-10-11 RX ADMIN — ACETAMINOPHEN 1000 MG: 10 INJECTION INTRAVENOUS at 10:03

## 2023-10-11 RX ADMIN — EPHEDRINE SULFATE 10 MG: 50 INJECTION, SOLUTION INTRAVENOUS at 09:48

## 2023-10-11 RX ADMIN — ROCURONIUM BROMIDE 50 MG: 10 INJECTION, SOLUTION INTRAVENOUS at 09:05

## 2023-10-11 RX ADMIN — FENTANYL CITRATE 50 MCG: 50 INJECTION, SOLUTION INTRAMUSCULAR; INTRAVENOUS at 09:05

## 2023-10-11 RX ADMIN — ONDANSETRON 4 MG: 2 INJECTION INTRAMUSCULAR; INTRAVENOUS at 10:00

## 2023-10-11 NOTE — INTERVAL H&P NOTE
H&P reviewed. After examining the patient I find no changes in the patients condition since the H&P had been written.     Vitals:    10/11/23 0811   BP: 156/92   Pulse: 91   Resp: 20   Temp: (!) 97 °F (36.1 °C)   SpO2: 98%

## 2023-10-11 NOTE — DISCHARGE INSTR - AVS FIRST PAGE
Follow up: Following discharge from the hospital call the office in 1-2 days to set up a post operative appointment to be seen in 2 weeks by Dr. Yosi Reyes if you do not already have an appointment   [phone]  Call the office if you have increased pain not relieved with pain medicine. Call the office if you have a fever,redness, the wound opens up, you have pus draining from your incision. AFTER YOU LEAVE: Following discharge from the hospital, you may have some questions about your procedure, your activities or your general condition. These instructions may answer some of your questions and help you adjust during the first few days following your operation. You can expect to be sore and tender mostly around the incisions. This pain should last approximally 5 days and gradually improve daily. Incisions:    - You may apply ice to the incisions to help with pain. Avoid heat as this may make the glue tacky   - It is normal to have some bruising, swelling or mild discoloration around the incision. If increasing redness or pain develops, call our office immediately. Do not apply any creams, lotions, or ointments. If you have dressings:  - You may remove the gauze dressing from your incisions 48 hours after surgery. Underneath this dressing is a tape like dressing called Steri Strips. Leave the steri strips in place for 5-7 days. They may fall off on their own. This is OK. Bathing:   - You may shower daily with soap and water the day after the procedure. It is OK to GENTLY wash the incision with soap and water then pat dry. DO NOT SCRUB. Do NOT soak incision in a tub, pool, or hot tub for 2 weeks. Diet:   - Resume your normal diet unless specified otherwise. We recommend you slowly advance your diet. Try to start with softer bland foods and gradually advance as tolerated. Be sure to consume plenty of water. Avoid alcohol.         Activity/Restrictions:   - The evening following the procedure you should rest as much as possible, sitting, lying or reclining. you should be sure someone remains with you until the next morning. Gradually increase your activity daily. Walking 3-4 times daily is good and stairs are ok. Listen to your body. If you start to get tired or sore then rest.   - No strenuous activity or exercise for 3-4 weeks. - No heavy lifting, pushing or pulling.   - No driving for 5 days or while taking narcotics for pain. Return or work: You may return to work or other activities as soon as your pain is controlled and you feel comfortable. For many people, this is 5 to 7 days after surgery. If your job requires heavy lifting you will need to be on light duty for 2-3 weeks. Medication:   - If you were given a prescription for Percocet, Norco, or Vicodin for pain be sure to eat prior to taking as these medications as they may cause nausea and vomiting on an empty stomach.    - If you do not want to take stronger medications for pain, you may take Tylenol, Aleve OR Ibuprofen  - DO NOT take Tylenol  (acetaminophen) with these medication for a fever or for further pain control as these medications already contain Tylenol in them. Take one or the other. Do not exceed more than 4000 mg of acetaminophen in 24 hours or 3000 mg if you have liver disease. - If you were given an antibiotic take it until it is finished. Contact your healthcare provider if:   You have a fever over 101°F (38°C) or chills. You have pain or nausea that is not relieved by medicine. You have redness and swelling around your incisions, or blood or pus is leaking from your incisions. You are constipated or have diarrhea. Your skin or eyes are yellow, or your bowel movements are pale. You have questions or concerns about your surgery, condition, or care. Seek care immediately or call 911 if:   You cannot stop vomiting. Your bowel movements are black or bloody.     You have pain in your abdomen and it is swollen or hard. Your arm or leg feels warm, tender, and painful. It may look swollen and red. You feel lightheaded, short of breath, and have chest pain. You cough up blood.

## 2023-10-11 NOTE — ANESTHESIA PREPROCEDURE EVALUATION
Procedure:  REPAIR HERNIA INGUINAL, LAPAROSCOPIC (Bilateral: Groin)    Relevant Problems   ANESTHESIA   (-) History of anesthesia complications      CARDIO   (+) CAD (coronary artery disease)   (+) Dyslipidemia, goal LDL below 70   (+) Hypertension   (-) Chest pain   (-) RENEE (dyspnea on exertion)      ENDO   (+) Type 2 diabetes mellitus with other specified complication, unspecified whether long term insulin use (HCC)      PULMONARY   (-) Shortness of breath   (-) URI (upper respiratory infection)      Other   (+) Hx of CABG        Physical Exam    Airway    Mallampati score: II  TM Distance: >3 FB  Neck ROM: full     Dental       Cardiovascular      Pulmonary      Other Findings        Anesthesia Plan  ASA Score- 3     Anesthesia Type- general with ASA Monitors. Additional Monitors:     Airway Plan: ETT. Plan Factors-Exercise tolerance (METS): >4 METS. Chart reviewed. EKG reviewed. Existing labs reviewed. Patient summary reviewed. Obstructive sleep apnea risk education given perioperatively. Induction- intravenous. Postoperative Plan-     Informed Consent- Anesthetic plan and risks discussed with patient. I personally reviewed this patient with the CRNA. Discussed and agreed on the Anesthesia Plan with the CRNA. Antonia Saldivar

## 2023-10-11 NOTE — ANESTHESIA POSTPROCEDURE EVALUATION
Post-Op Assessment Note    CV Status:  Stable  Pain Score: 0    Pain management: adequate     Mental Status:  Awake   Hydration Status:  Euvolemic   PONV Controlled:  Controlled   Airway Patency:  Patent      Post Op Vitals Reviewed: Yes      Staff: CRNA         No notable events documented.     BP   139/76   Temp  97.6   Pulse  78   Resp   20   SpO2   100

## 2023-10-16 PROCEDURE — 88302 TISSUE EXAM BY PATHOLOGIST: CPT | Performed by: PATHOLOGY

## 2023-10-16 NOTE — OP NOTE
OPERATIVE REPORT  PATIENT NAME: Eunice Butler    :  1955  MRN: [id number]  Pt Location: [facility] OR ROOM 02    SURGERY DATE: 10/11/2023    Surgeon(s) and Role:     * Ernesto Steele MD - Primary     * Dalia Milligan PA-C - Assisting    Preop Diagnosis:  Non-recurrent bilateral inguinal hernia without obstruction or gangrene [K40.20]    Post-Op Diagnosis Codes:     * Non-recurrent bilateral inguinal hernia without obstruction or gangrene [K40.20]    Procedure(s):  Right - LAPAROSCOPIC CONVERTED TO OPEN INGUINAL HERNIA REPAIR WITH MESH. EXCISION OF INGUINAL MASS    Specimen(s):  ID Type Source Tests Collected by Time Destination   1 : Mass inguinal canal and Hernia Sac Tissue Hernia Sac, Right Inguinal TISSUE EXAM Ernesto Steele MD 10/11/2023 1023        Estimated Blood Loss:   Minimal    Drains:  * No LDAs found *    Anesthesia Type:   General    Operative Indications:  Non-recurrent bilateral inguinal hernia without obstruction or gangrene [K40.20]      Operative Findings: There was a mass attached to the right cord and hernia sac on the right possibly consistent with a calcified cord lipoma - pathology pending    Complications:   None    Procedure and Technique:    The patient was identified he was placed in the operating table in a supine position. After adequate anesthesia induction and satisfactory endotracheal intubation the abdomen and perineum were prepped and draped in sterile usual fashion with chlor prep. Timeout was called the patient was identified as well as surgical sites. An infra umbilical incision was made with a scalpel, taken down through the subcutaneous tissue with electrocautery. The fascia of the right rectus muscle was opened with electrocautery in a transverse fashion. The rectus muscle was retracted laterally and with blunt finger dissection a space was created posterior to the rectus muscle.  The balloon dissector was inserted into the preperitoneal space and insufflated under direct vision. The balloon was deflated and subsequently retrieved. The structural balloon was placed into the preperitoneal space and insufflated. The preperitoneal space was insufflated with CO2. The scope was advanced and then we proceeded to place a 5 mm trocar in the suprapubic area ×2 under direct vision. At this point the symphysis pubis was identified and Charles's ligament on the right side. The cord structures were identified by gentle pulling on the right testicle. I could not reduce the hernia and identified a firm mass that persisted in the right groin preventing reduction of the hernia. The decision was made to open and simply fix the symptomatic side (right). The structural balloon was deflated and subsequently retrieved. The anterior sheath of the infra umbilical port site fascia was closed with a 0 Vicryl in an interrupted figure-of-eight fashion. The pubic tubercle and ASIS were marked and a 6 cm garrett was made along that line. A mixture of 0.25% Marcaine and 1% lidocaine was used to anesthetize the skin and incision was made with a 15 blade. Dissection was carried through the soft tissue including Campers and Scarpas and the superficial epigastric vein was ligated using hemostats and 2-0 vicryl ties. This was then carried down to expose the inguinal canal and inguinal ligament along its lower edge. The external oblique fascia was split along the course of its fibers, exposing the inguinal canal. There was a firm mass in the inguinal canal. The cord and nerve were looped using a Penrose drain and reflected out of the field. The sac was identified and opened to confirm there were byron intraabdominal contents. This was dissected back to the preperitoneal fat and the mass was dissected off the cord and sac using the bovey and the sac was stick tied with 2-0 vicryl and amputated . Both were passed off as specimen. The inguinal canal was irrigated.  The defect was exposed and a piece of 7.5 x 15 bard soft polypropylene mesh was trimmed to size and placed over the defect. 2-0 Prolene suture was then used in an interrupted fashion to place the mesh with the suture being sewn from the pubic tubercle inferiorly and superiorly along the canal to a level just beyond the internal ring. The mesh was split to allow passage of the cord and nerve into the canal without entrapment. The contents were then returned to canal and the external oblique fascia was then closed in a continuous fashion using 2-0 Vicryl suture taking care not to cause entrapment. Scarpas was closed with interrupted 3-0 vicryl and deep dermals were placed. A 4-0 monocryl was used to run a subcuticular skin suture and covered with histoacryl. Instrument, sponge, and needle counts were correct prior to closure and at the conclusion of the case. I was present for the entire procedure., A qualified resident physician was not available. , and A physician assistant was required during the procedure for retraction, tissue handling, dissection and suturing.     Patient Disposition:  PACU  and extubated and stable        SIGNATURE: Lindy Duenas MD  DATE: October 16, 2023  TIME: 2:21 PM

## 2023-10-31 ENCOUNTER — OFFICE VISIT (OUTPATIENT)
Dept: SURGERY | Facility: CLINIC | Age: 68
End: 2023-10-31
Payer: COMMERCIAL

## 2023-10-31 VITALS
TEMPERATURE: 97.7 F | OXYGEN SATURATION: 100 % | HEART RATE: 72 BPM | WEIGHT: 196.2 LBS | BODY MASS INDEX: 31.53 KG/M2 | HEIGHT: 66 IN | DIASTOLIC BLOOD PRESSURE: 78 MMHG | RESPIRATION RATE: 18 BRPM | SYSTOLIC BLOOD PRESSURE: 122 MMHG

## 2023-10-31 DIAGNOSIS — Z98.890 S/P INGUINAL HERNIA REPAIR: Primary | ICD-10-CM

## 2023-10-31 DIAGNOSIS — Z87.19 S/P INGUINAL HERNIA REPAIR: Primary | ICD-10-CM

## 2023-10-31 PROCEDURE — 99212 OFFICE O/P EST SF 10 MIN: CPT | Performed by: SURGERY

## 2023-11-20 ENCOUNTER — OFFICE VISIT (OUTPATIENT)
Dept: SURGERY | Facility: CLINIC | Age: 68
End: 2023-11-20
Payer: COMMERCIAL

## 2023-11-20 VITALS
BODY MASS INDEX: 32.3 KG/M2 | DIASTOLIC BLOOD PRESSURE: 82 MMHG | WEIGHT: 201 LBS | HEIGHT: 66 IN | HEART RATE: 72 BPM | OXYGEN SATURATION: 97 % | RESPIRATION RATE: 18 BRPM | TEMPERATURE: 97.5 F | SYSTOLIC BLOOD PRESSURE: 122 MMHG

## 2023-11-20 DIAGNOSIS — Z87.19 S/P INGUINAL HERNIA REPAIR: Primary | ICD-10-CM

## 2023-11-20 DIAGNOSIS — Z98.890 S/P INGUINAL HERNIA REPAIR: Primary | ICD-10-CM

## 2023-11-20 PROCEDURE — 99212 OFFICE O/P EST SF 10 MIN: CPT | Performed by: SURGERY

## 2023-11-20 NOTE — PROGRESS NOTES
Assessment/Plan:     1. S/P inguinal hernia repair      Doing well after lap converted to open [facility] repair. No signs or symptoms of complication or recurrence. F/u prn. Return to regular activity. Subjective:      Patient ID: Erleen Kayser is a 76 y.o. male. Triage Notes:    Mr Tej Hernandez is following up after lap converted to open [facility] repair. This is his second followup. Discomfort has resolved. Normal appetite, energy and bowel habits. The following portions of the patient's history were reviewed and updated as appropriate: allergies, current medications, past family history, past medical history, past social history, past surgical history and problem list.    Review of Systems   Constitutional:  Negative for appetite change, chills, fever and unexpected weight change. HENT:  Negative for hearing loss, sore throat, trouble swallowing and voice change. Eyes:  Negative for visual disturbance. Respiratory:  Negative for cough, chest tightness, shortness of breath and wheezing. Cardiovascular:  Negative for chest pain and palpitations. Gastrointestinal:  Negative for abdominal distention and blood in stool. Endocrine: Negative for cold intolerance and heat intolerance. Genitourinary:  Negative for difficulty urinating. Skin:  Negative for color change and rash. Neurological:  Negative for dizziness, speech difficulty, light-headedness and numbness. Hematological:  Does not bruise/bleed easily. Psychiatric/Behavioral:  Negative for confusion, hallucinations and suicidal ideas.           Objective:      /82   Pulse 72   Temp 97.5 °F (36.4 °C) (Temporal)   Resp 18   Ht 5' 6" (1.676 m)   Wt 91.2 kg (201 lb)   SpO2 97%   BMI 32.44 kg/m²     Below is the patient's most recent value for Albumin, ALT, AST, BUN, Calcium, Chloride, Cholesterol, CO2, Creatinine, GFR, Glucose, HDL, Hematocrit, Hemoglobin, Hemoglobin A1C, LDL, Magnesium, Phosphorus, Platelets, Potassium, PSA, Sodium, Triglycerides, and WBC. Lab Results   Component Value Date    ALT 19 08/09/2023    AST 12 (L) 08/09/2023    BUN 23 09/26/2023    CALCIUM 9.5 09/26/2023     09/26/2023    CO2 31 09/26/2023    CREATININE 0.87 09/26/2023    HCT 40.8 09/26/2023    HGB 13.5 09/26/2023    HGBA1C 7.8 (H) 08/29/2023     09/26/2023    K 4.5 09/26/2023    WBC 3.2 (L) 09/26/2023     Note: for a comprehensive list of the patient's lab results, access the Results Review activity. Physical Exam  Vitals and nursing note reviewed. Constitutional:       General: He is not in acute distress. Appearance: He is well-developed. HENT:      Head: Normocephalic and atraumatic. Eyes:      General:         Right eye: No discharge. Left eye: No discharge. Neck:      Vascular: No JVD. Cardiovascular:      Rate and Rhythm: Normal rate and regular rhythm. Pulmonary:      Effort: Pulmonary effort is normal. No respiratory distress. Abdominal:      General: There is no distension. Palpations: Abdomen is soft. Tenderness: There is no abdominal tenderness. There is no guarding. Comments: Port sites are clean and dry. No erythema or drainage. Genitourinary:     Comments: right sided incision is clean and dry. No erythema or drainage. Soft/flat  Musculoskeletal:         General: Normal range of motion. Cervical back: Normal range of motion and neck supple. Skin:     General: Skin is warm and dry. Neurological:      Mental Status: He is alert and oriented to person, place, and time. Psychiatric:         Behavior: Behavior normal.         Thought Content:  Thought content normal.         Judgment: Judgment normal.             Procedures

## 2023-11-21 NOTE — PROGRESS NOTES
Assessment/Plan:    Pathology Results:  Final Diagnosis   A. Hernia Sac, Right Inguinal, Mass inguinal canal and Hernia Sac:  Hernia sac with granulation tissue, fat necrosis, fibrosis and hemorrhages  Detached nodular, lobulated fibroadipose tissue with fat necrosis        1. S/P inguinal hernia repair      He is recovering as expected. F/u in 4 weeks for recheck and repeat exam. Continue lifting restriction of 20 pounds. Increase non-strenuous exercise. Subjective:      Patient ID: Gaurav Delaney is a 76 y.o. male. Triage Notes:    Mr Georgie Emerson is following up about 2 weeks after laparoscopic converted to open [facility] repair. Reports feeling well. Pain has improved but still some soreness. Is eating. No diarrhea or constipation. No fevers/chills/drainage/redness. The following portions of the patient's history were reviewed and updated as appropriate: allergies, current medications, past family history, past medical history, past social history, past surgical history and problem list.    Review of Systems   Constitutional:  Negative for appetite change, chills, fever and unexpected weight change. HENT:  Negative for hearing loss, sore throat, trouble swallowing and voice change. Eyes:  Negative for visual disturbance. Respiratory:  Negative for cough, chest tightness, shortness of breath and wheezing. Cardiovascular:  Negative for chest pain and palpitations. Gastrointestinal:  Negative for abdominal distention and blood in stool. Constipation: improved. Endocrine: Negative for cold intolerance and heat intolerance. Genitourinary:  Negative for difficulty urinating and scrotal swelling. Musculoskeletal:  Gait problem: improved. Skin:  Negative for color change and rash. Neurological:  Negative for dizziness, speech difficulty, light-headedness and numbness. Hematological:  Does not bruise/bleed easily.    Psychiatric/Behavioral:  Negative for confusion, hallucinations and suicidal ideas.          Objective:      /78 (BP Location: Right arm, Patient Position: Sitting, Cuff Size: Standard)   Pulse 72   Temp 97.7 °F (36.5 °C)   Resp 18   Ht 5' 6" (1.676 m)   Wt 89 kg (196 lb 3.2 oz)   SpO2 100%   BMI 31.67 kg/m²     Below is the patient's most recent value for Albumin, ALT, AST, BUN, Calcium, Chloride, Cholesterol, CO2, Creatinine, GFR, Glucose, HDL, Hematocrit, Hemoglobin, Hemoglobin A1C, LDL, Magnesium, Phosphorus, Platelets, Potassium, PSA, Sodium, Triglycerides, and WBC. Lab Results   Component Value Date    ALT 19 08/09/2023    AST 12 (L) 08/09/2023    BUN 23 09/26/2023    CALCIUM 9.5 09/26/2023     09/26/2023    CO2 31 09/26/2023    CREATININE 0.87 09/26/2023    HCT 40.8 09/26/2023    HGB 13.5 09/26/2023    HGBA1C 7.8 (H) 08/29/2023     09/26/2023    K 4.5 09/26/2023    WBC 3.2 (L) 09/26/2023     Note: for a comprehensive list of the patient's lab results, access the Results Review activity. Physical Exam  Vitals and nursing note reviewed. Constitutional:       General: He is not in acute distress. Appearance: He is well-developed. HENT:      Head: Normocephalic and atraumatic. Eyes:      General:         Right eye: No discharge. Left eye: No discharge. Neck:      Vascular: No JVD. Cardiovascular:      Rate and Rhythm: Normal rate and regular rhythm. Pulmonary:      Effort: Pulmonary effort is normal. No respiratory distress. Abdominal:      General: There is no distension. Palpations: Abdomen is soft. Tenderness: There is no abdominal tenderness. There is no guarding. Comments: Port sites are clean and dry. No erythema or drainage. Genitourinary:     Comments: right sided incision is clean and dry. No erythema or drainage. +healing ridge. Musculoskeletal:         General: Normal range of motion. Cervical back: Normal range of motion and neck supple. Skin:     General: Skin is warm and dry.    Neurological: Mental Status: He is alert and oriented to person, place, and time. Psychiatric:         Behavior: Behavior normal.         Thought Content:  Thought content normal.         Judgment: Judgment normal.             Procedures

## 2025-02-05 ENCOUNTER — HOSPITAL ENCOUNTER (EMERGENCY)
Facility: HOSPITAL | Age: 70
Discharge: HOME | End: 2025-02-05
Attending: EMERGENCY MEDICINE
Payer: COMMERCIAL

## 2025-02-05 VITALS
OXYGEN SATURATION: 98 % | SYSTOLIC BLOOD PRESSURE: 133 MMHG | TEMPERATURE: 97.6 F | HEART RATE: 99 BPM | DIASTOLIC BLOOD PRESSURE: 79 MMHG | RESPIRATION RATE: 18 BRPM

## 2025-02-05 DIAGNOSIS — S16.1XXA ACUTE STRAIN OF NECK MUSCLE, INITIAL ENCOUNTER: ICD-10-CM

## 2025-02-05 DIAGNOSIS — M62.838 TRAPEZIUS MUSCLE SPASM: Primary | ICD-10-CM

## 2025-02-05 PROCEDURE — 99284 EMERGENCY DEPT VISIT MOD MDM: CPT | Performed by: NURSE PRACTITIONER

## 2025-02-05 PROCEDURE — 96372 THER/PROPH/DIAG INJ SC/IM: CPT

## 2025-02-05 PROCEDURE — 99283 EMERGENCY DEPT VISIT LOW MDM: CPT

## 2025-02-05 RX ORDER — KETOROLAC TROMETHAMINE 30 MG/ML
15 INJECTION, SOLUTION INTRAMUSCULAR; INTRAVENOUS ONCE
Status: COMPLETED | OUTPATIENT
Start: 2025-02-05 | End: 2025-02-05

## 2025-02-05 RX ORDER — CYCLOBENZAPRINE HCL 10 MG
10 TABLET ORAL ONCE
Status: COMPLETED | OUTPATIENT
Start: 2025-02-05 | End: 2025-02-05

## 2025-02-05 RX ORDER — CYCLOBENZAPRINE HCL 10 MG
10 TABLET ORAL 3 TIMES DAILY PRN
Qty: 21 TABLET | Refills: 0 | Status: SHIPPED | OUTPATIENT
Start: 2025-02-05 | End: 2025-02-12

## 2025-02-05 RX ORDER — LIDOCAINE 50 MG/G
1 PATCH TOPICAL ONCE
Status: DISCONTINUED | OUTPATIENT
Start: 2025-02-05 | End: 2025-02-05 | Stop reason: HOSPADM

## 2025-02-05 RX ADMIN — LIDOCAINE 5% 1 PATCH: 700 PATCH TOPICAL at 10:35

## 2025-02-05 RX ADMIN — KETOROLAC TROMETHAMINE 15 MG: 30 INJECTION, SOLUTION INTRAMUSCULAR at 10:35

## 2025-02-05 RX ADMIN — CYCLOBENZAPRINE HYDROCHLORIDE 10 MG: 10 TABLET, FILM COATED ORAL at 10:35

## 2025-02-05 NOTE — ED NOTES
Group Topic: BH Process Group     Date: 7/15/2020  Start Time: 11:00 AM  End Time: 11:45 AM  Facilitators: Janeth Bradley LPC    Focus: Recovery  Number in attendance: 6    Patients were given the opportunity to share individually about goals, current stressors and therapeutic assignments. Group and therapist feedback is welcomed and maladaptive skills are challenged with coping options.         Method: Group  Attendance: Present  Participation: Active  Patient Response: Attentive  Mood: Anxious  Thought Process: Ruminating      Pt shared tomorrow will be his last day and he is \"moderately okay with that.\" Pt shared he is contemplating going back to school and is looking at two different options. Pt reported he has been writing his to do list on the mirror but did not include enough hobbies. Pt reported he has been thinking about what he would like to do in his free time. He noted journaling as a hobby.     Janeth Bradley LPC       Patient was evaluated and assessed by provider prior to discharge.      Amie Dumas RN  02/05/25 [id number]

## 2025-02-05 NOTE — ED PROVIDER NOTES
Time reflects when diagnosis was documented in both MDM as applicable and the Disposition within this note       Time User Action Codes Description Comment    2/5/2025 10:29 AM Dilip Arcos Add [M62.838] Trapezius muscle spasm     2/5/2025 10:29 AM Dilip Arcos Add [S16.1XXA] Acute strain of neck muscle, initial encounter           ED Disposition       ED Disposition   Discharge    Condition   Stable    Date/Time   Wed Feb 5, 2025 10:29 AM    Comment   Alan Castañeda discharge to home/self care.                   Assessment & Plan       Medical Decision Making  Left-sided trapezius spasm and tenderness palpated.  Most likely muscle strain.  No indication for imaging without the presence of trauma.  Begin outpatient therapy such as massage or stretching topicals.  Ambulatory referral to physical therapy comprehensive spine.      Risk  Prescription drug management.             Medications   cyclobenzaprine (FLEXERIL) tablet 10 mg (10 mg Oral Given 2/5/25 1035)   ketorolac (TORADOL) injection 15 mg (15 mg Intramuscular Given 2/5/25 1035)       ED Risk Strat Scores                                              History of Present Illness       Chief Complaint   Patient presents with    Neck Pain     Left sided neck pain that started a week ago. Pain radiates up and down back. Neck feel stiff and hurts to turn head.        Past Medical History:   Diagnosis Date    Cancer (HCC)     prostate    Diabetes (HCC)     Diabetes mellitus (HCC)     Diverticulitis     HTN (hypertension)     Hx of CABG     Hypertension     Inguinal hernia     R    Prostate cancer (HCC)       Past Surgical History:   Procedure Laterality Date    COLONOSCOPY      HERNIA REPAIR Right 10/11/2023    Procedure: LAPAROSCOPIC CONVERTED TO OPEN INGUINAL HERNIA REPAIR WITH MESH, EXCISION OF INGUINAL MASS;  Surgeon: Ian Boyd MD;  Location: [facility];  Service: General    JOINT REPLACEMENT Left     knee    LAPAROSCOPIC COLON RESECTION      ROTATOR CUFF  REPAIR Left       Family History   Problem Relation Age of Onset    No Known Problems Mother     No Known Problems Father     No Known Problems Sister     No Known Problems Brother       Social History     Tobacco Use    Smoking status: Never     Passive exposure: Never    Smokeless tobacco: Never   Vaping Use    Vaping status: Never Used   Substance Use Topics    Alcohol use: Never    Drug use: Never      E-Cigarette/Vaping    E-Cigarette Use Never User       E-Cigarette/Vaping Substances    Nicotine No     THC No     CBD No     Flavoring No     Other No     Unknown No       I have reviewed and agree with the history as documented.     69-year-old male patient presenting here today with chief complaint of left-sided neck discomfort.  He reports he is not sure what he did but he has been having some left trapezius tenderness for the last week or so.  There are no alleviating factors.  Pain is exacerbated by movement of his neck and palpation of the area.  He has palpable spasm over the left trapezius.      Neck Pain  Associated symptoms: no chest pain and no fever        Review of Systems   Constitutional:  Negative for chills and fever.   HENT:  Negative for ear pain and sore throat.    Eyes:  Negative for pain and visual disturbance.   Respiratory:  Negative for cough and shortness of breath.    Cardiovascular:  Negative for chest pain and palpitations.   Gastrointestinal:  Negative for abdominal pain and vomiting.   Genitourinary:  Negative for dysuria and hematuria.   Musculoskeletal:  Positive for neck pain. Negative for arthralgias and back pain.   Skin:  Negative for color change and rash.   Neurological:  Negative for seizures and syncope.   All other systems reviewed and are negative.          Objective       ED Triage Vitals [02/05/25 1006]   Temperature Pulse Blood Pressure Respirations SpO2 Patient Position - Orthostatic VS   97.6 °F (36.4 °C) 99 133/79 18 98 % --      Temp src Heart Rate Source BP  Location FiO2 (%) Pain Score    -- -- -- -- --      Vitals      Date and Time Temp Pulse SpO2 Resp BP Pain Score FACES Pain Rating User   02/05/25 1006 97.6 °F (36.4 °C) 99 98 % 18 133/79 -- -- RS            Physical Exam  Vitals and nursing note reviewed.   Constitutional:       General: He is not in acute distress.     Appearance: He is well-developed.   HENT:      Head: Normocephalic and atraumatic.      Nose: No rhinorrhea.   Eyes:      General:         Right eye: No discharge.         Left eye: No discharge.      Conjunctiva/sclera: Conjunctivae normal.   Cardiovascular:      Rate and Rhythm: Normal rate.   Pulmonary:      Effort: Pulmonary effort is normal. No respiratory distress.   Abdominal:      General: There is no distension.      Tenderness: There is no guarding.   Musculoskeletal:         General: No deformity.      Cervical back: Normal range of motion and neck supple. No edema, signs of trauma or rigidity. Pain with movement and muscular tenderness present. No spinous process tenderness.   Skin:     General: Skin is warm and dry.      Coloration: Skin is not pale.   Neurological:      Mental Status: He is alert and oriented to person, place, and time.      Coordination: Coordination normal.   Psychiatric:         Mood and Affect: Mood normal.         Results Reviewed       None            No orders to display       Procedures    ED Medication and Procedure Management   Prior to Admission Medications   Prescriptions Last Dose Informant Patient Reported? Taking?   aspirin 81 mg chewable tablet  Self Yes No   Sig: Chew   atorvastatin (LIPITOR) 40 mg tablet  Self Yes No   Sig: Take 1 tablet by mouth daily   metFORMIN (GLUCOPHAGE) 1000 MG tablet  Self Yes No   Sig: TAKE 1 TABLET BY MOUTH 2 TIMES A DAY WITH MORNING AND EVENING MEALS   metoprolol succinate (TOPROL-XL) 50 mg 24 hr tablet  Self Yes No   Sig: Take 1 tablet by mouth daily   olmesartan-hydrochlorothiazide (BENICAR HCT) 20-12.5 MG per tablet   Self Yes No   Sig: Take 1 tablet by mouth daily      Facility-Administered Medications: None     Discharge Medication List as of 2/5/2025 10:30 AM        START taking these medications    Details   cyclobenzaprine (FLEXERIL) 10 mg tablet Take 1 tablet (10 mg total) by mouth 3 (three) times a day as needed for muscle spasms for up to 7 days, Starting Wed 2/5/2025, Until Wed 2/12/2025 at 2359, Normal           CONTINUE these medications which have NOT CHANGED    Details   aspirin 81 mg chewable tablet Chew, Historical Med      atorvastatin (LIPITOR) 40 mg tablet Take 1 tablet by mouth daily, Starting Wed 6/29/2022, Historical Med      metFORMIN (GLUCOPHAGE) 1000 MG tablet TAKE 1 TABLET BY MOUTH 2 TIMES A DAY WITH MORNING AND EVENING MEALS, Historical Med      metoprolol succinate (TOPROL-XL) 50 mg 24 hr tablet Take 1 tablet by mouth daily, Starting Sat 6/11/2022, Historical Med      olmesartan-hydrochlorothiazide (BENICAR HCT) 20-12.5 MG per tablet Take 1 tablet by mouth daily, Starting Thu 3/16/2023, Historical Med             ED SEPSIS DOCUMENTATION   Time reflects when diagnosis was documented in both MDM as applicable and the Disposition within this note       Time User Action Codes Description Comment    2/5/2025 10:29 AM Dilip Arcos Add [M62.838] Trapezius muscle spasm     2/5/2025 10:29 AM Dilip Arcos Add [S16.1XXA] Acute strain of neck muscle, initial encounter                  RASHEED Fernando  02/05/25 [id number]

## 2025-02-05 NOTE — Clinical Note
Alan Castañeda was seen and treated in our emergency department on 2/5/2025.                Diagnosis:     Alan  may return to work on return date.    He may return on this date: 02/07/2025         If you have any questions or concerns, please don't hesitate to call.      Amie Dumas RN    ______________________________           _______________          _______________  Hospital Representative                              Date                                Time

## 2025-02-05 NOTE — Clinical Note
Alan Castañeda was seen and treated in our emergency department on 2/5/2025.                Diagnosis:     Alan  may return to work on return date.    He may return on this date: 02/06/2025         If you have any questions or concerns, please don't hesitate to call.      Amie Dumas RN    ______________________________           _______________          _______________  Hospital Representative                              Date                                Time

## 2025-02-06 ENCOUNTER — NURSE TRIAGE (OUTPATIENT)
Dept: PHYSICAL THERAPY | Facility: OTHER | Age: 70
End: 2025-02-06

## 2025-02-06 DIAGNOSIS — M54.2 ACUTE NECK PAIN: Primary | ICD-10-CM

## 2025-02-06 DIAGNOSIS — M54.9 UPPER BACK PAIN ON LEFT SIDE: ICD-10-CM

## 2025-02-06 NOTE — TELEPHONE ENCOUNTER
Additional Information   Negative: Is this related to a work injury?   Negative: Is this related to an MVA?   Negative: Are you currently recieving homecare services?    Background - Initial Assessment  Clinical complaint: ED visit yesterday 02/05 due to Left Sided Lower Back Pain that started a week ago. No previous hx of neck pain. Patient states pain radiates up and down to the left sided of trapezius muscle. No pain down his arm , hand or back. No numbness or tingling sensation. Not seeing a doctor for this pain. Patient states pain is constant especially at night. Worse with movements. Patient described pain as aching.  Date of onset: a week ago  Frequency of pain: constant  Quality of pain: aching    Protocols used: [facility] Protocol

## 2025-02-06 NOTE — TELEPHONE ENCOUNTER
Additional Information   Negative: Has the patient had unexplained weight loss?   Negative: Does the patient have a fever?   Negative: Is the patient experiencing urine retention?   Negative: Is the patient experiencing blood in sputum?   Negative: Has the patient experienced major trauma? (fall from height, high speed collision, direct blow to spine) and is also experiencing nausea, light-headedness, or loss of consciousness?   Negative: Is the patient experiencing acute drop foot or paralysis?   Negative: Is this a chronic condition?    Protocols used: [facility] Protocol  Nurse completed the triage and NO RF s/s were present. Referral entered for the [facility] site and the contact/phone number information was given to the patient as well.   Patients information was sent to the preferred site and pt made aware clerical would be calling to schedule the evaluation appointment. Nurse encouraged him to call the site if he does not hear from clerical beforehand. Patient Agreed.    Patient did not voice any additional questions or concerns at this time.   Patient is aware current/past complaints, relevant dx, additional referrals and treatment/options will be discussed at time of evaluation/consultation appointment.   Patient is in agreement with plan.  Patient very appreciative of CB and referral placement.     Nurse wished him well and the CS referral was closed.

## 2025-04-09 NOTE — ED PROVIDER NOTE - DOMESTIC TRAVEL HIGH RISK QUESTION
LM for pt to call office to r/s appt   
Patient needs to reschedule [facility] appointments that are scheduled for 4/10/25.    Please call her at:  [phone] to reschedule both appointments.    Mo Akers CCC/A  Audiologist  [id number]  NPI#:  [id number]    
No

## (undated) DEVICE — LIGHT HANDLE COVER SLEEVE DISP BLUE STELLAR

## (undated) DEVICE — SCD SEQUENTIAL COMPRESSION COMFORT SLEEVE MEDIUM KNEE LENGTH: Brand: KENDALL SCD

## (undated) DEVICE — TROCAR: Brand: KII SLEEVE

## (undated) DEVICE — GLOVE SRG BIOGEL 7

## (undated) DEVICE — ELECTRODE LAP L WIRE E-Z CLEAN 33CM -0100

## (undated) DEVICE — HYDROPHILIC WOUND DRESSING WITH ZINC PLUS VITAMINS A AND B6.: Brand: DERMAGRAN®-B

## (undated) DEVICE — GAUZE SPONGES,16 PLY: Brand: CURITY

## (undated) DEVICE — NEEDLE 25G X 1 1/2

## (undated) DEVICE — SUT VICRYL 0 UR-6 27 IN J603H

## (undated) DEVICE — SUT MONOCRYL 4-0 PS-2 18 IN Y496G

## (undated) DEVICE — 3M™ TEGADERM™ TRANSPARENT FILM DRESSING FRAME STYLE, 1626W, 4 IN X 4-3/4 IN (10 CM X 12 CM), 50/CT 4CT/CASE: Brand: 3M™ TEGADERM™

## (undated) DEVICE — TROCAR HERNIA OVAL PERITONEAL DISTENTION BALLOON

## (undated) DEVICE — ANTI-FOG SOLUTION WITH FOAM PAD: Brand: DEVON

## (undated) DEVICE — TOWEL SET X-RAY

## (undated) DEVICE — 4-PORT MANIFOLD: Brand: NEPTUNE 2

## (undated) DEVICE — [HIGH FLOW INSUFFLATOR,  DO NOT USE IF PACKAGE IS DAMAGED,  KEEP DRY,  KEEP AWAY FROM SUNLIGHT,  PROTECT FROM HEAT AND RADIOACTIVE SOURCES.]: Brand: PNEUMOSURE

## (undated) DEVICE — DRAPE EQUIPMENT RF WAND

## (undated) DEVICE — 3M™ TEGADERM™ TRANSPARENT FILM DRESSING FRAME STYLE, 1624W, 2-3/8 IN X 2-3/4 IN (6 CM X 7 CM), 100/CT 4CT/CASE: Brand: 3M™ TEGADERM™

## (undated) DEVICE — TROCAR HERNIA BALLON STRUCTURED 10 MM

## (undated) DEVICE — NEPTUNE E-SEP SMOKE EVACUATION PENCIL, COATED, 70MM BLADE, PUSH BUTTON SWITCH: Brand: NEPTUNE E-SEP

## (undated) DEVICE — SUT PROLENE 2-0 CT-2 30 IN 8411H

## (undated) DEVICE — SUT VICRYL 3-0 SH 27 IN J416H

## (undated) DEVICE — TUBING SMOKE EVAC W/FILTRATION DEVICE PLUMEPORT ACTIV

## (undated) DEVICE — ALLENTOWN LAP CHOLE APP PACK: Brand: CARDINAL HEALTH

## (undated) DEVICE — INTENDED FOR TISSUE SEPARATION, AND OTHER PROCEDURES THAT REQUIRE A SHARP SURGICAL BLADE TO PUNCTURE OR CUT.: Brand: BARD-PARKER SAFETY BLADES SIZE 11, STERILE

## (undated) DEVICE — 3M™ STERI-STRIP™ REINFORCED ADHESIVE SKIN CLOSURES, R1542, 1/4 IN X 1-1/2 IN (6 MM X 38 MM), 6 STRIPS/ENVELOPE: Brand: 3M™ STERI-STRIP™

## (undated) DEVICE — PAD GROUNDING ADULT

## (undated) DEVICE — 3M™ STERI-STRIP™ REINFORCED ADHESIVE SKIN CLOSURES, R1546, 1/4 IN X 4 IN (6 MM X 100 MM), 10 STRIPS/ENVELOPE: Brand: 3M™ STERI-STRIP™

## (undated) DEVICE — GAUZE SPONGES,8 PLY: Brand: CURITY

## (undated) DEVICE — CHLORAPREP HI-LITE 26ML ORANGE